# Patient Record
Sex: FEMALE | Race: BLACK OR AFRICAN AMERICAN | NOT HISPANIC OR LATINO | Employment: STUDENT | URBAN - METROPOLITAN AREA
[De-identification: names, ages, dates, MRNs, and addresses within clinical notes are randomized per-mention and may not be internally consistent; named-entity substitution may affect disease eponyms.]

---
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
## 2024-01-24 ENCOUNTER — HOSPITAL ENCOUNTER (EMERGENCY)
Facility: CLINIC | Age: 19
Discharge: HOME | End: 2024-01-24
Attending: FAMILY MEDICINE | Admitting: PHYSICIAN ASSISTANT
Payer: COMMERCIAL

## 2024-01-24 VITALS
WEIGHT: 91.2 LBS | OXYGEN SATURATION: 100 % | RESPIRATION RATE: 16 BRPM | SYSTOLIC BLOOD PRESSURE: 110 MMHG | TEMPERATURE: 99.7 F | DIASTOLIC BLOOD PRESSURE: 78 MMHG | HEART RATE: 70 BPM

## 2024-01-24 DIAGNOSIS — Z11.3 ROUTINE SCREENING FOR STI (SEXUALLY TRANSMITTED INFECTION): ICD-10-CM

## 2024-01-24 DIAGNOSIS — K62.5 BRIGHT RED BLOOD PER RECTUM: ICD-10-CM

## 2024-01-24 DIAGNOSIS — Z32.01 PREGNANCY TEST POSITIVE: ICD-10-CM

## 2024-01-24 DIAGNOSIS — K64.4 EXTERNAL HEMORRHOID: ICD-10-CM

## 2024-01-24 LAB
ALBUMIN SERPL BCG-MCNC: 4.4 G/DL (ref 3.5–5.2)
ALP SERPL-CCNC: 56 U/L (ref 40–150)
ALT SERPL W P-5'-P-CCNC: 12 U/L (ref 0–50)
ANION GAP SERPL CALCULATED.3IONS-SCNC: 10 MMOL/L (ref 7–15)
AST SERPL W P-5'-P-CCNC: 27 U/L (ref 0–35)
BASOPHILS # BLD AUTO: 0 10E3/UL (ref 0–0.2)
BASOPHILS NFR BLD AUTO: 1 %
BILIRUB SERPL-MCNC: 0.7 MG/DL
BUN SERPL-MCNC: 8.8 MG/DL (ref 6–20)
CALCIUM SERPL-MCNC: 9.6 MG/DL (ref 8.6–10)
CHLORIDE SERPL-SCNC: 102 MMOL/L (ref 98–107)
CREAT SERPL-MCNC: 0.69 MG/DL (ref 0.51–0.95)
DEPRECATED HCO3 PLAS-SCNC: 23 MMOL/L (ref 22–29)
EGFRCR SERPLBLD CKD-EPI 2021: >90 ML/MIN/1.73M2
EOSINOPHIL # BLD AUTO: 0.1 10E3/UL (ref 0–0.7)
EOSINOPHIL NFR BLD AUTO: 1 %
ERYTHROCYTE [DISTWIDTH] IN BLOOD BY AUTOMATED COUNT: 12.1 % (ref 10–15)
GLUCOSE SERPL-MCNC: 111 MG/DL (ref 70–99)
HCG INTACT+B SERPL-ACNC: 44 MIU/ML
HCG UR QL: NEGATIVE
HCG UR QL: POSITIVE
HCT VFR BLD AUTO: 42.3 % (ref 35–47)
HGB BLD-MCNC: 14.3 G/DL (ref 11.7–15.7)
IMM GRANULOCYTES # BLD: 0 10E3/UL
IMM GRANULOCYTES NFR BLD: 0 %
INTERNAL QC OK POCT: NORMAL
LYMPHOCYTES # BLD AUTO: 3.7 10E3/UL (ref 0.8–5.3)
LYMPHOCYTES NFR BLD AUTO: 44 %
MCH RBC QN AUTO: 30.3 PG (ref 26.5–33)
MCHC RBC AUTO-ENTMCNC: 33.8 G/DL (ref 31.5–36.5)
MCV RBC AUTO: 90 FL (ref 78–100)
MONOCYTES # BLD AUTO: 0.3 10E3/UL (ref 0–1.3)
MONOCYTES NFR BLD AUTO: 4 %
NEUTROPHILS # BLD AUTO: 4.2 10E3/UL (ref 1.6–8.3)
NEUTROPHILS NFR BLD AUTO: 50 %
NRBC # BLD AUTO: 0 10E3/UL
NRBC BLD AUTO-RTO: 0 /100
PLATELET # BLD AUTO: 173 10E3/UL (ref 150–450)
POCT KIT EXPIRATION DATE: NORMAL
POCT KIT LOT NUMBER: NORMAL
POTASSIUM SERPL-SCNC: 3.5 MMOL/L (ref 3.4–5.3)
PROT SERPL-MCNC: 6.9 G/DL (ref 6.4–8.3)
RBC # BLD AUTO: 4.72 10E6/UL (ref 3.8–5.2)
SODIUM SERPL-SCNC: 135 MMOL/L (ref 135–145)
WBC # BLD AUTO: 8.3 10E3/UL (ref 4–11)

## 2024-01-24 PROCEDURE — 85025 COMPLETE CBC W/AUTO DIFF WBC: CPT | Performed by: PHYSICIAN ASSISTANT

## 2024-01-24 PROCEDURE — 99283 EMERGENCY DEPT VISIT LOW MDM: CPT | Performed by: PHYSICIAN ASSISTANT

## 2024-01-24 PROCEDURE — 36415 COLL VENOUS BLD VENIPUNCTURE: CPT | Performed by: PHYSICIAN ASSISTANT

## 2024-01-24 PROCEDURE — 81025 URINE PREGNANCY TEST: CPT | Performed by: PHYSICIAN ASSISTANT

## 2024-01-24 PROCEDURE — 80053 COMPREHEN METABOLIC PANEL: CPT | Performed by: PHYSICIAN ASSISTANT

## 2024-01-24 PROCEDURE — 84702 CHORIONIC GONADOTROPIN TEST: CPT | Performed by: PHYSICIAN ASSISTANT

## 2024-01-24 PROCEDURE — 87491 CHLMYD TRACH DNA AMP PROBE: CPT | Performed by: PHYSICIAN ASSISTANT

## 2024-01-24 ASSESSMENT — ACTIVITIES OF DAILY LIVING (ADL)
ADLS_ACUITY_SCORE: 35
ADLS_ACUITY_SCORE: 35

## 2024-01-25 ENCOUNTER — TELEPHONE (OUTPATIENT)
Dept: BEHAVIORAL HEALTH | Facility: CLINIC | Age: 19
End: 2024-01-25
Payer: COMMERCIAL

## 2024-01-25 ENCOUNTER — TELEPHONE (OUTPATIENT)
Dept: FAMILY MEDICINE | Facility: CLINIC | Age: 19
End: 2024-01-25
Payer: COMMERCIAL

## 2024-01-25 LAB
C TRACH DNA SPEC QL PROBE+SIG AMP: NEGATIVE
N GONORRHOEA DNA SPEC QL NAA+PROBE: NEGATIVE

## 2024-01-25 NOTE — TELEPHONE ENCOUNTER
Reached patient. Scheduled 1/26/2024.     Sent [employer] activation link to set up [employer]. Encouraged to compete screeners prior to appointment. Said will complete through [employer] once set up.    Ameena Joseph  Transition Clinic Coordinator  01/25/24 10:54 AM

## 2024-01-25 NOTE — TELEPHONE ENCOUNTER
Does not appear patient has been seen at [facility] previously  Was patient requesting to establish care at [place] or [facility]?  Appears PCP is currently outside of the system  Could possibly forward to ED provider for review of results?  Thanks,  Emma REID RN

## 2024-01-25 NOTE — ED TRIAGE NOTES
Pt states that earlier today she noticed some blood in her stool.Pt also requeting STD and pregnancy test.

## 2024-01-25 NOTE — TELEPHONE ENCOUNTER
Pt called looking for results.   Please call her back when they have been looked at   Thanks,   Moo Nam RN  [facility]

## 2024-01-25 NOTE — ED NOTES
Pt asking for mental health referral, due to recent poor mental health. GISELLA Soto notified of pt request and speaking to pt.

## 2024-01-25 NOTE — ED NOTES
Pt emotional and upset after finding out she is pregnant. Writer spent time with pt, emotional support, and comfort.

## 2024-01-25 NOTE — DISCHARGE INSTRUCTIONS
Here in the emergency room you have reassuring vital signs.  Do not have any current bleeding here.  On your exam it does look like you have an external hemorrhoid although I do not see any findings for bleeding.  We discussed high-fiber foods, pushing fluids, continue to monitor.  I will place referral to follow-up with primary care.  They should be calling to schedule a follow-up visit.  He should return he develop any new or worsening symptoms including any significant bleeding, abdominal pain fevers any other new or worsening symptoms.    In addition I placed a psychiatry referral and they should be calling to schedule follow-up.  We discussed if you ever feel unsafe at home have thoughts of hurting yourself or others, I recommend returning here to the emergency department.    You also had a positive pregnancy test today.  We discussed the importance of following with OB/GYN.  I placed a referral.  If you develop any abdominal pain vaginal bleeding you need to return back to the emergency room.  We discussed starting prenatal vitamin, avoiding alcohol, smoking and other medications that are not safe in pregnancy.    If you develop any new or worsening symptoms, is important to return right away to the emergency department for further evaluation and management.

## 2024-01-25 NOTE — ED PROVIDER NOTES
ED Provider Note  [facility]      History     Chief Complaint   Patient presents with    Melena     Pt states that earlier today she noticed some blood in her stool.    Exposure to STD     Pt requesting std and pregnancy check.     HPI    Lynsey Sharpe is a 18 year old female who presents emergency department tonight with concerns for blurred blood per rectum.  Patient states that starting today she noticed several drops of bright red blood on the toilet paper after having a bowel movement, as well as some bright red blood on the stool in the toilet.  She has had approximately 3 bowel movements today.  She states she is not having any perirectal pain.  She denies any rectal trauma or injury.  She is not anticoagulated.  She denies any associate abdominal pain vomiting, or coffee-ground emesis.  No known history of hemorrhoids.    In addition patient states she does want STI testing and pregnancy testing today.  She is monogamous with a male partner.  She denies any associated discharge or vaginal itching.  No pelvic pain.  Her last period ended earlier this month about 3 weeks ago.  She has been sexually active without protection.  She reports a history of recent BV which was treated with Flagyl and resolved.    Past Medical History  No past medical history on file.  No past surgical history on file.  No current outpatient medications on file.    No Known Allergies  Family History  No family history on file.  Social History          A medically appropriate review of systems was performed with pertinent positives and negatives noted in the HPI, and all other systems negative.    Physical Exam   BP: 117/76  Pulse: 90  Temp: 99.7  F (37.6  C)  Resp: 16  Weight: 41.4 kg (91 lb 3.2 oz)  SpO2: 100 %  Physical Exam    GENERAL APPEARANCE: The patient is well developed, well appearing, and in no acute distress.  HEAD:  Normocephalic and atraumatic.   EENT: Voice normal.  NECK: Trachea is  midline.  ABDOMEN: Soft, flat, and benign. No mass, tenderness, guarding, or rebound.Bowel sounds are present.  Female chaperone present for perirectal exam.  Appreciated small external hemorrhoid at the 6 clock position without visible bleeding.  No visible fissures noted.  ELGIN deferred.  EXTREMITIES: No cyanosis, clubbing, or edema.  NEUROLOGIC: No focal sensory or motor deficits are noted.  PSYCHIATRIC: The patient is awake, alert.  Appropriate mood and affect.  SKIN: Warm, dry, and well perfused. Good turgor.    ED Course, Procedures, & Data         Results for orders placed or performed during the hospital encounter of 01/24/24   HCG qualitative urine     Status: Abnormal   Result Value Ref Range    hCG Urine Qualitative Positive (A) Negative   HCG quantitative pregnancy (blood)     Status: Abnormal   Result Value Ref Range    hCG Quantitative 44 (H) <5 mIU/mL   Comprehensive metabolic panel     Status: Abnormal   Result Value Ref Range    Sodium 135 135 - 145 mmol/L    Potassium 3.5 3.4 - 5.3 mmol/L    Carbon Dioxide (CO2) 23 22 - 29 mmol/L    Anion Gap 10 7 - 15 mmol/L    Urea Nitrogen 8.8 6.0 - 20.0 mg/dL    Creatinine 0.69 0.51 - 0.95 mg/dL    GFR Estimate >90 >60 mL/min/1.73m2    Calcium 9.6 8.6 - 10.0 mg/dL    Chloride 102 98 - 107 mmol/L    Glucose 111 (H) 70 - 99 mg/dL    Alkaline Phosphatase 56 40 - 150 U/L    AST 27 0 - 35 U/L    ALT 12 0 - 50 U/L    Protein Total 6.9 6.4 - 8.3 g/dL    Albumin 4.4 3.5 - 5.2 g/dL    Bilirubin Total 0.7 <=1.2 mg/dL   CBC with platelets and differential     Status: None   Result Value Ref Range    WBC Count 8.3 4.0 - 11.0 10e3/uL    RBC Count 4.72 3.80 - 5.20 10e6/uL    Hemoglobin 14.3 11.7 - 15.7 g/dL    Hematocrit 42.3 35.0 - 47.0 %    MCV 90 78 - 100 fL    MCH 30.3 26.5 - 33.0 pg    MCHC 33.8 31.5 - 36.5 g/dL    RDW 12.1 10.0 - 15.0 %    Platelet Count 173 150 - 450 10e3/uL    % Neutrophils 50 %    % Lymphocytes 44 %    % Monocytes 4 %    % Eosinophils 1 %    %  Basophils 1 %    % Immature Granulocytes 0 %    NRBCs per 100 WBC 0 <1 /100    Absolute Neutrophils 4.2 1.6 - 8.3 10e3/uL    Absolute Lymphocytes 3.7 0.8 - 5.3 10e3/uL    Absolute Monocytes 0.3 0.0 - 1.3 10e3/uL    Absolute Eosinophils 0.1 0.0 - 0.7 10e3/uL    Absolute Basophils 0.0 0.0 - 0.2 10e3/uL    Absolute Immature Granulocytes 0.0 <=0.4 10e3/uL    Absolute NRBCs 0.0 10e3/uL   hCG qual urine POCT     Status: Normal   Result Value Ref Range    HCG Qual Urine Negative Negative    Internal QC Check POCT Valid Valid    POCT Kit Lot Number [id number]     POCT Kit Expiration Date 07-    CBC with platelets differential     Status: None    Narrative    The following orders were created for panel order CBC with platelets differential.  Procedure                               Abnormality         Status                     ---------                               -----------         ------                     CBC with platelets and d...[[id number]]                      Final result                 Please view results for these tests on the individual orders.   ABO/Rh type and screen *Canceled*     Status: None ()    Narrative    The following orders were created for panel order ABO/Rh type and screen.  Procedure                               Abnormality         Status                     ---------                               -----------         ------                     Adult Type and Screen[[id number]]                                                         Please view results for these tests on the individual orders.   ABO/Rh type and screen     Status: None ()    Narrative    The following orders were created for panel order ABO/Rh type and screen.  Procedure                               Abnormality         Status                     ---------                               -----------         ------                     Adult Type and Screen[[id number]]                                                         Please  view results for these tests on the individual orders.     Medications - No data to display  Labs Ordered and Resulted from Time of ED Arrival to Time of ED Departure   HCG QUALITATIVE URINE - Abnormal       Result Value    hCG Urine Qualitative Positive (*)    HCG QUANTITATIVE PREGNANCY - Abnormal    hCG Quantitative 44 (*)    COMPREHENSIVE METABOLIC PANEL - Abnormal    Sodium 135      Potassium 3.5      Carbon Dioxide (CO2) 23      Anion Gap 10      Urea Nitrogen 8.8      Creatinine 0.69      GFR Estimate >90      Calcium 9.6      Chloride 102      Glucose 111 (*)     Alkaline Phosphatase 56      AST 27      ALT 12      Protein Total 6.9      Albumin 4.4      Bilirubin Total 0.7     HCG QUALITATIVE URINE POCT - Normal    HCG Qual Urine Negative      Internal QC Check POCT Valid      POCT Kit Lot Number [id number]      POCT Kit Expiration Date 07-     CBC WITH PLATELETS AND DIFFERENTIAL    WBC Count 8.3      RBC Count 4.72      Hemoglobin 14.3      Hematocrit 42.3      MCV 90      MCH 30.3      MCHC 33.8      RDW 12.1      Platelet Count 173      % Neutrophils 50      % Lymphocytes 44      % Monocytes 4      % Eosinophils 1      % Basophils 1      % Immature Granulocytes 0      NRBCs per 100 WBC 0      Absolute Neutrophils 4.2      Absolute Lymphocytes 3.7      Absolute Monocytes 0.3      Absolute Eosinophils 0.1      Absolute Basophils 0.0      Absolute Immature Granulocytes 0.0      Absolute NRBCs 0.0     TYPE AND SCREEN, ADULT   CHLAMYDIA TRACHOMATIS/NEISSERIA GONORRHOEAE BY PCR   ABO/RH TYPE AND SCREEN     No orders to display          Critical care was not performed.     Medical Decision Making  The patient's presentation was of low complexity (an acute and uncomplicated illness or injury).    The patient's evaluation involved:  ordering and/or review of 3+ test(s) in this encounter (see separate area of note for details)    The patient's management necessitated only low risk treatment.    Assessment & Plan     This is a 18-year-old female presenting with 3 episodes of bright red blood per rectum on wiping without rectal pain abdominal pain vomiting use of anticoagulation or history of hemorrhoids.  On presentation here vital signs within normal limits.  External exam shows external hemorrhoid at the 6 o'clock position without findings to suggest current bleeding, thrombus, or fissure.  ELGIN deferred as patient is not having any reported constipation or rectal pain.  She is not losing large volume blood at this time.  Suspect the external hemorrhoid was bleeding versus bleeding internal hemorrhoid.  We discussed recommendations of following up with primary care.  We discussed importance of high-fiber diet and pushing fluids.  Patient did request chlamydia and gonorrhea testing along with her pregnancy test.  She not exhibiting any symptoms of STIs.  Chlamydia gonorrhea obtained, pregnancy obtained here, initially per nursing staff negative, notified by lab that the results had returned positive.  On reevaluation of the point-of-care pregnancy this also is positive.  Discussed these results with the patient.  We obtained additional blood work including CBC chemistry and a beta quant, CBC without leukocytosis or anemia chemistry normal beta-hCG 44.  Patient reports in prior pregnancies she has been told to electively abort due to her history of anorexia and nutrition status.  We discussed starting prenatal vitamin recommendations of following up closely with OB/GYN.  Referral placed.  We discussed return precautions.    Patient seen and discussed with attending physician , who agrees with my plan of care.    Patient has no other questions or concerns at this time.  Red flag signs were addressed, and they were in agreement with the patient care plan provided.    I have reviewed the nursing notes. I have reviewed the findings, diagnosis, plan and need for follow up with the patient.    New Prescriptions    No  medications on file       Final diagnoses:   Bright red blood per rectum   Routine screening for STI (sexually transmitted infection)   External hemorrhoid   Pregnancy test positive       ISABEL Ho  [facility] EMERGENCY DEPARTMENT  1/24/2024

## 2024-01-26 ENCOUNTER — TELEPHONE (OUTPATIENT)
Dept: BEHAVIORAL HEALTH | Facility: CLINIC | Age: 19
End: 2024-01-26
Payer: COMMERCIAL

## 2024-01-26 NOTE — TELEPHONE ENCOUNTER
Link to virtual visit sent to [phone] at 1402 and 1409.  Pt did not join virtual visit.   Call placed at 1416 - left message encouraging pt to call back if she would like to reschedule appt

## 2024-02-01 ENCOUNTER — APPOINTMENT (OUTPATIENT)
Dept: GENERAL RADIOLOGY | Facility: CLINIC | Age: 19
End: 2024-02-01
Payer: COMMERCIAL

## 2024-02-01 ENCOUNTER — HOSPITAL ENCOUNTER (EMERGENCY)
Facility: CLINIC | Age: 19
Discharge: HOME | End: 2024-02-01
Attending: EMERGENCY MEDICINE | Admitting: EMERGENCY MEDICINE
Payer: COMMERCIAL

## 2024-02-01 VITALS
SYSTOLIC BLOOD PRESSURE: 104 MMHG | HEIGHT: 62 IN | TEMPERATURE: 98.6 F | HEART RATE: 81 BPM | BODY MASS INDEX: 16.8 KG/M2 | DIASTOLIC BLOOD PRESSURE: 83 MMHG | WEIGHT: 91.3 LBS | RESPIRATION RATE: 19 BRPM | OXYGEN SATURATION: 97 %

## 2024-02-01 DIAGNOSIS — S61.012A THUMB LACERATION, LEFT, INITIAL ENCOUNTER: ICD-10-CM

## 2024-02-01 PROCEDURE — 12001 RPR S/N/AX/GEN/TRNK 2.5CM/<: CPT | Mod: FA | Performed by: EMERGENCY MEDICINE

## 2024-02-01 PROCEDURE — 250N000009 HC RX 250

## 2024-02-01 PROCEDURE — 99283 EMERGENCY DEPT VISIT LOW MDM: CPT | Performed by: EMERGENCY MEDICINE

## 2024-02-01 PROCEDURE — 73140 X-RAY EXAM OF FINGER(S): CPT | Mod: LT

## 2024-02-01 PROCEDURE — 99283 EMERGENCY DEPT VISIT LOW MDM: CPT | Mod: 25 | Performed by: EMERGENCY MEDICINE

## 2024-02-01 RX ORDER — LIDOCAINE HYDROCHLORIDE 10 MG/ML
10 INJECTION, SOLUTION INFILTRATION; PERINEURAL ONCE
Status: COMPLETED | OUTPATIENT
Start: 2024-02-01 | End: 2024-02-01

## 2024-02-01 RX ADMIN — LIDOCAINE HYDROCHLORIDE 10 ML: 10 INJECTION, SOLUTION INFILTRATION; PERINEURAL at 18:42

## 2024-02-01 ASSESSMENT — ACTIVITIES OF DAILY LIVING (ADL): ADLS_ACUITY_SCORE: 35

## 2024-02-01 NOTE — ED PROVIDER NOTES
[facility] EMERGENCY DEPARTMENT ([facility])    2/01/24      ED PROVIDER NOTE        History     Chief Complaint   Patient presents with    Laceration     Left thumb and 4th finger cut on knife     The history is provided by the patient. No  was used.     Lynsey Sharpe is a 18 year old female presents to the ED for evaluation of accidental laceration to left thumb and ring finger with a knife. Her past medical history includes abnormal uterine bleeding, epigastric pain, lung nodule, and lower GI bleed.  She presents for evaluation of a left thumb laceration that occurred just prior to arrival.  She states that she was trying to cut a zip tie off of a child's toy with a knife when the knife slipped causing a laceration to the lateral aspect of her thumb and a superficial laceration to the dorsal aspect of her fourth left finger.  She reports some numbness just proximal to the laceration but denies any distal numbness or tingling.  She did wash the wound out with water prior to arrival.  On thumb opposition strength, she does have some noted decreased strength and states that it is due to inability versus pain.  She states that her last tetanus vaccination was within the last 5 years.  She otherwise denies any other injuries.  She states that this was purely an accident and not in any way intentional.  She states the knife was clean.    Per [facility] last Tdap 6/21/2020.  She is right-hand dominant.      Past Medical History  Past Medical History:   Diagnosis Date    Sjogren's syndrome (H24)      History reviewed. No pertinent surgical history.  No current outpatient medications on file.    No Known Allergies  Family History  History reviewed. No pertinent family history.  Social History   Social History     Tobacco Use    Smoking status: Never    Smokeless tobacco: Never   Substance Use Topics    Alcohol use: Not Currently    Drug use: Yes     Types: Marijuana         A medically appropriate  "review of systems was performed with pertinent positives and negatives noted in the HPI, and all other systems negative.    Physical Exam   BP: 107/73  Pulse: 79  Temp: 98.2  F (36.8  C)  Resp: 16  Height: 157.5 cm (5' 2\")  Weight: 41.4 kg (91 lb 4.8 oz)  SpO2: 96 %  Physical Exam  Constitutional:       General: She is not in acute distress.     Appearance: Normal appearance. She is normal weight. She is not ill-appearing, toxic-appearing or diaphoretic.   Cardiovascular:      Rate and Rhythm: Normal rate and regular rhythm.      Pulses: Normal pulses.      Heart sounds: Normal heart sounds.   Pulmonary:      Effort: Pulmonary effort is normal.      Breath sounds: Normal breath sounds.   Musculoskeletal:      Left hand: Laceration and tenderness (at laceration site) present. No swelling or deformity. Normal range of motion. Decreased strength of thumb/finger opposition (minimal). Normal strength of finger abduction and wrist extension. Decreased sensation (small area of skin just proximal to the laceration; does not extend  proximal of the thumb MCP joint). Normal capillary refill. Normal pulse.        Hands:       Comments: No tendon injury or involvement on evaluation and manipulation of the wound area   Neurological:      General: No focal deficit present.      Mental Status: She is alert and oriented to person, place, and time.   Psychiatric:         Mood and Affect: Mood normal.         Behavior: Behavior normal.           ED Course, Procedures, & Data      [facility]    -Laceration Repair    Date/Time: 2/1/2024 9:50 PM    Performed by: Sudha Parker PA-C  Authorized by: Sudha Parker PA-C    Risks, benefits and alternatives discussed.      ANESTHESIA (see MAR for exact dosages):     Anesthesia method:  Nerve block    Block location:  Left thumb    Block needle gauge:  27 G    Block anesthetic:  Lidocaine 1% w/o epi    Block technique:  Digital block    " Block injection procedure:  Anatomic landmarks identified, anatomic landmarks palpated, negative aspiration for blood, introduced needle and incremental injection    Block outcome:  Anesthesia achieved    LACERATION DETAILS     Location:  Finger    Finger location:  L thumb    Length (cm):  2    REPAIR TYPE:     Repair type:  Simple    EXPLORATION:     Hemostasis achieved with:  Direct pressure    Wound exploration: wound explored through full range of motion and entire depth of wound probed and visualized      Wound extent: nerve damage      Wound extent: no foreign body, no tendon damage and no underlying fracture      Contaminated: no      TREATMENT:     Area cleansed with:  Saline    Amount of cleaning:  Standard    Irrigation solution:  Sterile saline    Irrigation method:  Pressure wash    Visualized foreign bodies/material removed: no      SKIN REPAIR     Repair method:  Sutures    Suture size:  6-0    Suture technique:  Simple interrupted    Number of sutures:  6    APPROXIMATION     Approximation:  Close    POST-PROCEDURE DETAILS     Dressing:  Antibiotic ointment, adhesive bandage and splint for protection      PROCEDURE    Patient Tolerance:  Patient tolerated the procedure well with no immediate complications  [facility]    -Laceration Repair    Date/Time: 2/1/2024 9:56 PM    Performed by: Sudha Parker PA-C  Authorized by: Sudha Parker PA-C    Risks, benefits and alternatives discussed.      ANESTHESIA (see MAR for exact dosages):     Anesthesia method:  None  LACERATION DETAILS     Location:  Finger    Finger location:  L ring finger    Length (cm):  0.5    REPAIR TYPE:     Repair type:  Simple    EXPLORATION:     Hemostasis achieved with:  Direct pressure    Contaminated: no      TREATMENT:     Area cleansed with:  Saline    Amount of cleaning:  Standard    Irrigation solution:  Sterile saline    Irrigation method:  Pressure wash    Visualized  foreign bodies/material removed: no      SKIN REPAIR     Repair method:  Tissue adhesive    APPROXIMATION     Approximation:  Close    POST-PROCEDURE DETAILS     Dressing:  Open (no dressing)      PROCEDURE    Patient Tolerance:  Patient tolerated the procedure well with no immediate complications               Tetanus vaccination status reviewed: last tetanus booster 4 years ago (06/21/20), tetanus re-vaccination not indicated.       Results for orders placed or performed during the hospital encounter of 02/01/24   -Laceration Repair     Status: None (Preliminary result)    Narrative    Sudha Parker PA-C     2/1/2024  9:59 PM  [facility]    -Laceration Repair    Date/Time: 2/1/2024 9:50 PM    Performed by: Sudha Parker PA-C  Authorized by: Sudha Parker PA-C    Risks, benefits and alternatives discussed.      ANESTHESIA (see MAR for exact dosages):     Anesthesia method:  Nerve block    Block location:  Left thumb    Block needle gauge:  27 G    Block anesthetic:  Lidocaine 1% w/o epi    Block technique:  Digital block    Block injection procedure:  Anatomic landmarks identified, anatomic   landmarks palpated, negative aspiration for blood, introduced needle and   incremental injection    Block outcome:  Anesthesia achieved    LACERATION DETAILS     Location:  Finger    Finger location:  L thumb    Length (cm):  2    REPAIR TYPE:     Repair type:  Simple    EXPLORATION:     Hemostasis achieved with:  Direct pressure    Wound exploration: wound explored through full range of motion and   entire depth of wound probed and visualized      Wound extent: nerve damage      Wound extent: no foreign body, no tendon damage and no underlying   fracture      Contaminated: no      TREATMENT:     Area cleansed with:  Saline    Amount of cleaning:  Standard    Irrigation solution:  Sterile saline    Irrigation method:  Pressure wash    Visualized foreign  bodies/material removed: no      SKIN REPAIR     Repair method:  Sutures    Suture size:  6-0    Suture technique:  Simple interrupted    Number of sutures:  6    APPROXIMATION     Approximation:  Close    POST-PROCEDURE DETAILS     Dressing:  Antibiotic ointment, adhesive bandage and splint for   protection      PROCEDURE    Patient Tolerance:  Patient tolerated the procedure well with no immediate   complications   -Laceration Repair     Status: None (Preliminary result)    Narrative    Sudha Parker PA-C     2/1/2024  9:59 PM  [facility]    -Laceration Repair    Date/Time: 2/1/2024 9:56 PM    Performed by: Sudha Parker PA-C  Authorized by: Sudha Parker PA-C    Risks, benefits and alternatives discussed.      ANESTHESIA (see MAR for exact dosages):     Anesthesia method:  None  LACERATION DETAILS     Location:  Finger    Finger location:  L ring finger    Length (cm):  0.5    REPAIR TYPE:     Repair type:  Simple    EXPLORATION:     Hemostasis achieved with:  Direct pressure    Contaminated: no      TREATMENT:     Area cleansed with:  Saline    Amount of cleaning:  Standard    Irrigation solution:  Sterile saline    Irrigation method:  Pressure wash    Visualized foreign bodies/material removed: no      SKIN REPAIR     Repair method:  Tissue adhesive    APPROXIMATION     Approximation:  Close    POST-PROCEDURE DETAILS     Dressing:  Open (no dressing)      PROCEDURE    Patient Tolerance:  Patient tolerated the procedure well with no immediate   complications   Fingers XR, 2-3 views, left     Status: None    Narrative    EXAM: XR FINGER LEFT G/E 2 VIEWS  LOCATION: [facility]  DATE: 2/1/2024    INDICATION: Left thumb pain.  COMPARISON: None.      Impression    IMPRESSION: Normal joint spaces and alignment. No fracture.       Medications   lidocaine 1 % injection 10 mL (10 mLs Intradermal $Given 2/1/24 1199)      Labs Ordered and Resulted from Time of ED Arrival to Time of ED Departure - No data to display  Fingers XR, 2-3 views, left   Final Result   IMPRESSION: Normal joint spaces and alignment. No fracture.                Critical care was not performed.     Medical Decision Making  The patient's presentation was of low complexity (an acute and uncomplicated illness or injury).    The patient's evaluation involved:  ordering and/or review of 1 test(s) in this encounter (see separate area of note for details)    The patient's management necessitated moderate risk (a decision regarding minor procedure (laceration repair) with risk factors of none).    Assessment & Plan    Lynsey is a 18-year-old female that presented with complaints of a left thumb laceration after accidentally cutting herself with a knife.  Acceptable vital signs.  Patient in no acute distress and nontoxic-appearing.  Skin numbness proximal to the laceration but no extension into the hand, wrist, or forearm.  Mild amount of weakness with thumb opposition noted compared to right hand but upon wound evaluation, no noted tendon involvement or injury.  Patient otherwise has full active range of motion of the affected left thumb.  X-ray negative for any fracture, dislocation, or radiopaque foreign body.  Digital block to the thumb performed with full anesthesia.  Laceration was repaired with sutures.  Subsequent superficial laceration to the dorsal aspect of the left ring finger repaired with skin adhesive.  Tetanus vaccination is currently up-to-date and a booster was not administered today.  Patient otherwise stable for discharge home.  Strict return precautions given.  Due to patient's mild weakness elicited on some and finger opposition, a referral to orthopedics hand surgery was sent for reevaluation in the next 1 to 2 weeks.  Advise suture removal in the next 7 to 10 days by PCP office.  Wound care instructions discussed at length and given in the  discharge paperwork.  Advised Tylenol ibuprofen as needed for discomfort.  Patient was agreeable to the discharge treatment plan, voiced understanding, and all questions answered prior to discharge.    I have reviewed the nursing notes. I have reviewed the findings, diagnosis, plan and need for follow up with the patient.    There are no discharge medications for this patient.      Final diagnoses:   Thumb laceration, left, initial encounter       Sudha Parker PA-C    [facility] EMERGENCY DEPARTMENT  2/1/2024        Sudha Parker PA-C  02/01/24 2159  ---------------------------------------------------------------------------------------------------------  --    ED Attending Physician Attestation    I Lacey Ricks MD, cared for this patient with the Advanced Practice Provider (ANDERS). I have performed a history and physical examination of the patient independent of the ANDERS. I reviewed the ANDERS's documentation above and agree with the documented findings and plan of care. I personally provided a substantive portion of the care for this patient, including the complete Medical Decision Making. Please see the ANDERS's documentation for full details.    Summary of HPI, PE, ED Course   Patient is a 18 year old female evaluated in the emergency department for L thumb laceration.  Laceration is located along the medial aspect of the left thumb at the IP joint.  No visible tendon on exam, though patient is mildly weak with thumb opposition.  Still suspect it is unlikely that there is a tendon injury/laceration, however we will advise that she follow-up with orthopedics clinic if she is continuing to notice any weakness.  Wound care instructions given.  Patient verbalizes understanding.  After the completion of care in the emergency department, the patient was discharged.        MDM:  I agree with the MDM as above documented by the ANDERS    Lacey Ricks MD  Emergency Medicine        [place],  Lacey Arias MD  02/01/24 9105

## 2024-02-01 NOTE — ED TRIAGE NOTES
Triage Assessment (Adult)       Row Name 02/01/24 1715          Triage Assessment    Airway WDL WDL        Respiratory WDL    Respiratory WDL WDL        Skin Circulation/Temperature WDL    Skin Circulation/Temperature WDL WDL        Cardiac WDL    Cardiac WDL WDL        Peripheral/Neurovascular WDL    Peripheral Neurovascular WDL WDL        Cognitive/Neuro/Behavioral WDL    Cognitive/Neuro/Behavioral WDL WDL

## 2024-02-02 NOTE — DISCHARGE INSTRUCTIONS
Have sutures removed in 7 to 10 days by your PCP office  Keep the wound area clean and covered as to not introduce infection to the area  You may utilize Vaseline or Aquaphor to the suture area to keep the wound area soft and provide a protective barrier prior to bandages  Do not submerge the wound area in water until sutures are removed and wound has healed  A referral to orthopedics/hand surgery was sent from the ED today due to mild decrease in strength of your thumb.  Suspect decrease in strength is due to pain of the laceration site but if you feel your symptoms have not improved after wound has become healing, do not hesitate to follow-up with their office for reevaluation  Otherwise continue to monitor the wound area for any possible development of infection including redness, swelling, drainage, red streaking from the wound site or systemic symptoms such as fever, chills, nausea, or vomiting and seek medical attention appropriately

## 2024-02-13 NOTE — TELEPHONE ENCOUNTER
DIAGNOSIS: Thumb laceration, left, initial encounter [S61.012A]   APPOINTMENT DATE: 02/14/2024   NOTES STATUS DETAILS   DISCHARGE REPORT from the ER Internal 02/01/2024 - [facility] ED   XRAYS (IMAGES & REPORTS) Internal 02/01/2024 - Left Finger

## 2024-02-14 ENCOUNTER — PRE VISIT (OUTPATIENT)
Dept: ORTHOPEDICS | Facility: CLINIC | Age: 19
End: 2024-02-14

## 2024-04-19 ENCOUNTER — PATIENT OUTREACH (OUTPATIENT)
Dept: ONCOLOGY | Facility: CLINIC | Age: 19
End: 2024-04-19

## 2024-04-19 ENCOUNTER — APPOINTMENT (OUTPATIENT)
Dept: GENERAL RADIOLOGY | Facility: CLINIC | Age: 19
End: 2024-04-19
Attending: FAMILY MEDICINE
Payer: COMMERCIAL

## 2024-04-19 ENCOUNTER — HOSPITAL ENCOUNTER (EMERGENCY)
Facility: CLINIC | Age: 19
Discharge: HOME | End: 2024-04-19
Attending: FAMILY MEDICINE | Admitting: FAMILY MEDICINE
Payer: COMMERCIAL

## 2024-04-19 ENCOUNTER — APPOINTMENT (OUTPATIENT)
Dept: CT IMAGING | Facility: CLINIC | Age: 19
End: 2024-04-19
Attending: FAMILY MEDICINE
Payer: COMMERCIAL

## 2024-04-19 ENCOUNTER — APPOINTMENT (OUTPATIENT)
Dept: ULTRASOUND IMAGING | Facility: CLINIC | Age: 19
End: 2024-04-19
Attending: FAMILY MEDICINE
Payer: COMMERCIAL

## 2024-04-19 VITALS
HEART RATE: 69 BPM | SYSTOLIC BLOOD PRESSURE: 105 MMHG | DIASTOLIC BLOOD PRESSURE: 72 MMHG | BODY MASS INDEX: 17.34 KG/M2 | WEIGHT: 94.2 LBS | OXYGEN SATURATION: 100 % | TEMPERATURE: 98 F | RESPIRATION RATE: 16 BRPM | HEIGHT: 62 IN

## 2024-04-19 DIAGNOSIS — R04.2 HEMOPTYSIS: ICD-10-CM

## 2024-04-19 DIAGNOSIS — R05.3 CHRONIC COUGH: ICD-10-CM

## 2024-04-19 DIAGNOSIS — J84.10 LUNG GRANULOMA (H): ICD-10-CM

## 2024-04-19 DIAGNOSIS — R91.8 PULMONARY NODULES: Primary | ICD-10-CM

## 2024-04-19 LAB
ALBUMIN SERPL BCG-MCNC: 4.1 G/DL (ref 3.5–5.2)
ALBUMIN UR-MCNC: NEGATIVE MG/DL
ALP SERPL-CCNC: 55 U/L (ref 40–150)
ALT SERPL W P-5'-P-CCNC: 11 U/L (ref 0–50)
ANION GAP SERPL CALCULATED.3IONS-SCNC: 11 MMOL/L (ref 7–15)
APPEARANCE UR: CLEAR
AST SERPL W P-5'-P-CCNC: 34 U/L (ref 0–45)
ATRIAL RATE - MUSE: 60 BPM
BACTERIA #/AREA URNS HPF: ABNORMAL /HPF
BASOPHILS # BLD AUTO: 0.1 10E3/UL (ref 0–0.2)
BASOPHILS NFR BLD AUTO: 1 %
BILIRUB SERPL-MCNC: 0.6 MG/DL
BILIRUB UR QL STRIP: NEGATIVE
BUN SERPL-MCNC: 6 MG/DL (ref 6–20)
CALCIUM SERPL-MCNC: 9.3 MG/DL (ref 8.6–10)
CHLORIDE SERPL-SCNC: 104 MMOL/L (ref 98–107)
COLOR UR AUTO: YELLOW
CREAT SERPL-MCNC: 0.77 MG/DL (ref 0.51–0.95)
D DIMER PPP FEU-MCNC: 0.62 UG/ML FEU (ref 0–0.5)
DEPRECATED HCO3 PLAS-SCNC: 22 MMOL/L (ref 22–29)
DIASTOLIC BLOOD PRESSURE - MUSE: NORMAL MMHG
EGFRCR SERPLBLD CKD-EPI 2021: >90 ML/MIN/1.73M2
EOSINOPHIL # BLD AUTO: 0.2 10E3/UL (ref 0–0.7)
EOSINOPHIL NFR BLD AUTO: 2 %
ERYTHROCYTE [DISTWIDTH] IN BLOOD BY AUTOMATED COUNT: 13.7 % (ref 10–15)
GLUCOSE SERPL-MCNC: 77 MG/DL (ref 70–99)
GLUCOSE UR STRIP-MCNC: NEGATIVE MG/DL
HCG UR QL: NEGATIVE
HCT VFR BLD AUTO: 43.9 % (ref 35–47)
HGB BLD-MCNC: 14.4 G/DL (ref 11.7–15.7)
HGB UR QL STRIP: NEGATIVE
HOLD SPECIMEN: NORMAL
IMM GRANULOCYTES # BLD: 0 10E3/UL
IMM GRANULOCYTES NFR BLD: 0 %
INTERNAL QC OK POCT: NORMAL
INTERPRETATION ECG - MUSE: NORMAL
KETONES UR STRIP-MCNC: NEGATIVE MG/DL
LEUKOCYTE ESTERASE UR QL STRIP: ABNORMAL
LIPASE SERPL-CCNC: 24 U/L (ref 13–60)
LYMPHOCYTES # BLD AUTO: 2.9 10E3/UL (ref 0.8–5.3)
LYMPHOCYTES NFR BLD AUTO: 42 %
MCH RBC QN AUTO: 28.8 PG (ref 26.5–33)
MCHC RBC AUTO-ENTMCNC: 32.8 G/DL (ref 31.5–36.5)
MCV RBC AUTO: 88 FL (ref 78–100)
MONOCYTES # BLD AUTO: 0.3 10E3/UL (ref 0–1.3)
MONOCYTES NFR BLD AUTO: 4 %
MUCOUS THREADS #/AREA URNS LPF: PRESENT /LPF
NEUTROPHILS # BLD AUTO: 3.5 10E3/UL (ref 1.6–8.3)
NEUTROPHILS NFR BLD AUTO: 51 %
NITRATE UR QL: NEGATIVE
NRBC # BLD AUTO: 0 10E3/UL
NRBC BLD AUTO-RTO: 0 /100
P AXIS - MUSE: 116 DEGREES
PH UR STRIP: 6.5 [PH] (ref 5–7)
PLATELET # BLD AUTO: 142 10E3/UL (ref 150–450)
POCT KIT EXPIRATION DATE: NORMAL
POCT KIT LOT NUMBER: NORMAL
POTASSIUM SERPL-SCNC: 4.1 MMOL/L (ref 3.4–5.3)
PR INTERVAL - MUSE: 142 MS
PROT SERPL-MCNC: 7 G/DL (ref 6.4–8.3)
QRS DURATION - MUSE: 74 MS
QT - MUSE: 416 MS
QTC - MUSE: 416 MS
R AXIS - MUSE: 131 DEGREES
RBC # BLD AUTO: 5 10E6/UL (ref 3.8–5.2)
RBC URINE: <1 /HPF
SODIUM SERPL-SCNC: 137 MMOL/L (ref 135–145)
SP GR UR STRIP: 1.02 (ref 1–1.03)
SQUAMOUS EPITHELIAL: 7 /HPF
SYSTOLIC BLOOD PRESSURE - MUSE: NORMAL MMHG
T AXIS - MUSE: 169 DEGREES
TRANSITIONAL EPI: <1 /HPF
TROPONIN T SERPL HS-MCNC: <6 NG/L
UROBILINOGEN UR STRIP-MCNC: NORMAL MG/DL
VENTRICULAR RATE- MUSE: 60 BPM
WBC # BLD AUTO: 6.9 10E3/UL (ref 4–11)
WBC URINE: 2 /HPF

## 2024-04-19 PROCEDURE — 84484 ASSAY OF TROPONIN QUANT: CPT | Performed by: FAMILY MEDICINE

## 2024-04-19 PROCEDURE — 85025 COMPLETE CBC W/AUTO DIFF WBC: CPT | Performed by: FAMILY MEDICINE

## 2024-04-19 PROCEDURE — 83690 ASSAY OF LIPASE: CPT | Performed by: FAMILY MEDICINE

## 2024-04-19 PROCEDURE — 250N000011 HC RX IP 250 OP 636: Performed by: FAMILY MEDICINE

## 2024-04-19 PROCEDURE — 93010 ELECTROCARDIOGRAM REPORT: CPT | Performed by: FAMILY MEDICINE

## 2024-04-19 PROCEDURE — 81025 URINE PREGNANCY TEST: CPT | Performed by: FAMILY MEDICINE

## 2024-04-19 PROCEDURE — 250N000009 HC RX 250: Performed by: FAMILY MEDICINE

## 2024-04-19 PROCEDURE — 93005 ELECTROCARDIOGRAM TRACING: CPT | Performed by: FAMILY MEDICINE

## 2024-04-19 PROCEDURE — 81001 URINALYSIS AUTO W/SCOPE: CPT | Performed by: FAMILY MEDICINE

## 2024-04-19 PROCEDURE — 84450 TRANSFERASE (AST) (SGOT): CPT | Performed by: FAMILY MEDICINE

## 2024-04-19 PROCEDURE — 85379 FIBRIN DEGRADATION QUANT: CPT | Performed by: FAMILY MEDICINE

## 2024-04-19 PROCEDURE — 71046 X-RAY EXAM CHEST 2 VIEWS: CPT

## 2024-04-19 PROCEDURE — 99285 EMERGENCY DEPT VISIT HI MDM: CPT | Mod: 25 | Performed by: FAMILY MEDICINE

## 2024-04-19 PROCEDURE — 76705 ECHO EXAM OF ABDOMEN: CPT

## 2024-04-19 PROCEDURE — 36415 COLL VENOUS BLD VENIPUNCTURE: CPT | Performed by: FAMILY MEDICINE

## 2024-04-19 PROCEDURE — 71275 CT ANGIOGRAPHY CHEST: CPT

## 2024-04-19 PROCEDURE — 99284 EMERGENCY DEPT VISIT MOD MDM: CPT | Mod: 25 | Performed by: FAMILY MEDICINE

## 2024-04-19 RX ORDER — IOPAMIDOL 755 MG/ML
100 INJECTION, SOLUTION INTRAVASCULAR ONCE
Status: COMPLETED | OUTPATIENT
Start: 2024-04-19 | End: 2024-04-19

## 2024-04-19 RX ADMIN — SODIUM CHLORIDE 72 ML: 9 INJECTION, SOLUTION INTRAVENOUS at 12:48

## 2024-04-19 RX ADMIN — IOPAMIDOL 50 ML: 755 INJECTION, SOLUTION INTRAVENOUS at 12:46

## 2024-04-19 ASSESSMENT — ACTIVITIES OF DAILY LIVING (ADL)
ADLS_ACUITY_SCORE: 35
ADLS_ACUITY_SCORE: 33
ADLS_ACUITY_SCORE: 35
ADLS_ACUITY_SCORE: 35

## 2024-04-19 ASSESSMENT — COLUMBIA-SUICIDE SEVERITY RATING SCALE - C-SSRS
1. IN THE PAST MONTH, HAVE YOU WISHED YOU WERE DEAD OR WISHED YOU COULD GO TO SLEEP AND NOT WAKE UP?: NO
6. HAVE YOU EVER DONE ANYTHING, STARTED TO DO ANYTHING, OR PREPARED TO DO ANYTHING TO END YOUR LIFE?: YES
2. HAVE YOU ACTUALLY HAD ANY THOUGHTS OF KILLING YOURSELF IN THE PAST MONTH?: NO

## 2024-04-19 NOTE — DISCHARGE INSTRUCTIONS
Discharge to home continue follow-up with primary MD as well as follow-up with pulmonology at the [facility] for possible bronchoscopy.

## 2024-04-19 NOTE — ED TRIAGE NOTES
Patient reports chest discomfort when coughing up. Patient also has some night and day sweats for 2-3 months now. Patient reports poor appetite and thinks she lost some weight in the last 2-3 months.      Triage Assessment (Adult)       Row Name 04/19/24 0851          Triage Assessment    Airway WDL WDL        Respiratory WDL    Respiratory WDL cough;X     Cough Frequency infrequent        Skin Circulation/Temperature WDL    Skin Circulation/Temperature WDL WDL        Cardiac WDL    Cardiac WDL WDL        Peripheral/Neurovascular WDL    Peripheral Neurovascular WDL WDL        Cognitive/Neuro/Behavioral WDL    Cognitive/Neuro/Behavioral WDL WDL

## 2024-04-19 NOTE — PROGRESS NOTES
New IP (Interventional Pulmonology) referral rec'd.  Chart reviewed.       New Patient: Interventional Pulmonary (Lung nodule) Nurse Navigator Note    Referring provider: Wayne Monahan MD [facility]    Referred to (specialty): Interventional Pulmonary (Lung nodule)    Requested provider (if applicable): n/a    Date Referral Received: 4/19/2024    Evaluation for :  Lung nodule    Clinical History (per Nurse review of records provided):    **BOOK MARKED**    [facility]  Encounter Providers Encounter Date   HÉCTOR Bella CNP (Attending)  NPI: [id number]  [phone] (Work)  [phone] (Fax)  [address] [place] [address]  Pulmonary Medicine    ASSESSMENT & PLAN:    1) Abnormal CT chest: RLL ill-defined nodular densities with multiple calcified lymph nodes and nodules. With positive histoplasma titer, suspect nodules are related to prior exposure to histoplasma. Cannot entirely rule out sarcoid without biopsy. Positive SS-A titer.   - Refer to ID by Rheumatology   - May need to proceed with EBUS.    2) Shortness of breath: with pulmonary infiltrates, anemia, proteinuria and constitutional symptoms. + SS-A titer. PFT without obstruction but evidence of air trapping, cannot entirely rule out asthma as contributing factor.   - Continue trial of Fluticasone 1 puff BID, re-evaluate at next visit    3) Cough: Etiology undefined. Cannot rule out presence of auto-immune disorder and/ or asthma as contributing factor.  - Continue Fluticasone 1 puff BID, re-evaluate at next visit  - F/u with Rheumatology as directed    4) Positive serologies: Positive SS-A titer with sun sensitivity and constitutional symptoms.  - Further w/u per Rheumatology      - F/u in 4 months, sooner if needed.    Christine Gonzalez CNP    Pulmonary Medicine  [facility]    Records Location: Epic & CE ([facility] & )    RECORDS  NEEDED:    Last FIVE years CHEST imaging pushed to PACS from [facility]  1/11/2023:  PFT's from [facility]  --thank you!!    Additional testing needed prior to consult: TBDPFT's

## 2024-04-19 NOTE — ED PROVIDER NOTES
[facility] ([facility])  4/19/24  ED 19      History     Chief Complaint   Patient presents with    Cough     Ongoing cough x 2-3 months, cough up some streaks of blood with sputum x 2-3 months.     Chest Pain     HPI  Lynsey Sharpe is a 19 year old female with a past medical history significant for pulmonary granuloma and Sjogren's syndrome who presents to the Emergency Department for evaluation of ongoing cough and chest pain. Patient states for the past 2-3 months she has had an constant cough throughout the day. She coughs up streaks of blood at times. She endorses pain & chest discomfort when she coughs. She states her chest pain has been going on longer than the cough. She also feels nauseous when she coughs but denies vomiting. Patient also endorses epigastric pain on palpation however, denies this being the reason she is here in the ED. She reports history of lung scarring & chest nodules however, she has never followed up with a provider for this. She does not have a PCP. [facility] in [place] for usual care. She denies chance of pregnancy.     Past Medical History  Past Medical History:   Diagnosis Date    Sjogren's syndrome (H24)      History reviewed. No pertinent surgical history.  No current outpatient medications on file.    No Known Allergies  Family History  History reviewed. No pertinent family history.  Social History   Social History     Tobacco Use    Smoking status: Never    Smokeless tobacco: Never   Substance Use Topics    Alcohol use: Not Currently    Drug use: Yes     Types: Marijuana      Past medical history, past surgical history, medications, allergies, family history, and social history were reviewed with the patient. No additional pertinent items.      A medically appropriate review of systems was performed with pertinent positives and negatives noted in the HPI, and all other systems negative.    Physical Exam   BP: 105/72  Pulse:  "69  Temp: 98  F (36.7  C)  Resp: 16  Height: 157.5 cm (5' 2\")  Weight: 42.7 kg (94 lb 3.2 oz)  SpO2: 100 %  Physical Exam  Constitutional:       General: She is not in acute distress.     Appearance: Normal appearance. She is not diaphoretic.   HENT:      Head: Atraumatic.      Mouth/Throat:      Mouth: Mucous membranes are moist.   Eyes:      General: No scleral icterus.     Conjunctiva/sclera: Conjunctivae normal.   Cardiovascular:      Rate and Rhythm: Normal rate.      Heart sounds: Normal heart sounds.   Pulmonary:      Effort: No respiratory distress.      Breath sounds: Normal breath sounds.   Chest:      Chest wall: Tenderness present.   Abdominal:      General: Abdomen is flat.   Musculoskeletal:      Cervical back: Neck supple.   Skin:     General: Skin is warm.      Findings: No rash.   Neurological:      General: No focal deficit present.      Mental Status: She is alert and oriented to person, place, and time.      Sensory: No sensory deficit.      Motor: No weakness.      Coordination: Coordination normal.           ED Course, Procedures, & Data      Procedures       EKG Interpretation:      Interpreted by Wayne Monahan MD  Time reviewed:on arrival   Symptoms at time of EKG: chest pain   Rhythm: normal sinus   Rate: normal  Axis: NORMAL  Ectopy: none  Conduction: normal  ST Segments/ T Waves: No ST-T wave changes  Q Waves: none  Comparison to prior: No old EKG available    Clinical Impression: normal EKG         Results for orders placed or performed during the hospital encounter of 04/19/24   XR Chest 2 Views     Status: None    Narrative    CHEST TWO VIEWS 4/19/2024 10:07 AM     HISTORY: Chest pain, cough.    COMPARISON: None.       Impression    IMPRESSION: There are no acute infiltrates. The cardiac silhouette is  not enlarged. Pulmonary vasculature is unremarkable.    MUNA CASTILLO MD         SYSTEM ID:  [id number]   US Abdomen Limited (RUQ)     Status: None    Narrative    US ABDOMEN " LIMITED 4/19/2024 10:08 AM    CLINICAL HISTORY: Right upper quadrant tenderness.    TECHNIQUE: Limited abdominal ultrasound.    COMPARISON: None.    FINDINGS:    GALLBLADDER: No gallstones, wall thickening, or pericholecystic fluid.  Negative sonographic Dumont's sign.    BILE DUCTS: There is no biliary dilatation. The common duct measures 2  mm.    LIVER: Unremarkable where seen.    RIGHT KIDNEY: No hydronephrosis.    PANCREAS: The visualized portions of the pancreas are normal.    No ascites.      Impression    IMPRESSION:  1.  No acute process demonstrated.     MUNA CASTILLO MD         SYSTEM ID:  [id number]   CT Chest Pulmonary Embolism w Contrast     Status: None    Narrative    CT CHEST PULMONARY EMBOLISM WITH CONTRAST  4/19/2024 12:56 PM    CLINICAL HISTORY: Hemoptysis, elevated D-dimer.    TECHNIQUE: CT angiogram chest during arterial phase injection IV  contrast. 2D and 3D MIP reconstructions were performed by the CT  technologist. Dose reduction techniques were used.     CONTRAST: 50 mL Isovue 370    COMPARISON: Chest x-ray 4/19/2024.    FINDINGS:  ANGIOGRAM CHEST: Pulmonary arteries are normal caliber and negative  for pulmonary emboli. Thoracic aorta is negative for dissection. No CT  evidence of right heart strain.    LUNGS AND PLEURA: No effusions. No pneumothorax. No acute airspace  consolidation. There is a focal 1.1 cm nodular calcified opacity at  the anterior right lower lobe infrahilar region with some internal  cavitation that could be distended peripheral airways. This is seen on  series 8 image 125.    MEDIASTINUM/AXILLAE: No acute mediastinal abnormality. Calcified small  lymph nodes noted at the right subcarinal region. Small calcified  right hilar lymph node. No mediastinal focal fluid collection.    CORONARY ARTERY CALCIFICATION: None.    UPPER ABDOMEN: No acute upper abdominal abnormality.    MUSCULOSKELETAL: No focal bone lesion.      Impression    IMPRESSION:  1.  Nodule with  calcification and central cavitation along the  anterior right lower lobe infrahilar region. This could represent an  old granulomatous process. Broncholiths within distended small airways  is also a possibility.  2.  No acute airspace consolidation.  3.  No evidence for pulmonary embolism.    DANETTE VINCENT MD         SYSTEM ID:  [id number]   Comprehensive metabolic panel     Status: Normal   Result Value Ref Range    Sodium 137 135 - 145 mmol/L    Potassium 4.1 3.4 - 5.3 mmol/L    Carbon Dioxide (CO2) 22 22 - 29 mmol/L    Anion Gap 11 7 - 15 mmol/L    Urea Nitrogen 6.0 6.0 - 20.0 mg/dL    Creatinine 0.77 0.51 - 0.95 mg/dL    GFR Estimate >90 >60 mL/min/1.73m2    Calcium 9.3 8.6 - 10.0 mg/dL    Chloride 104 98 - 107 mmol/L    Glucose 77 70 - 99 mg/dL    Alkaline Phosphatase 55 40 - 150 U/L    AST 34 0 - 45 U/L    ALT 11 0 - 50 U/L    Protein Total 7.0 6.4 - 8.3 g/dL    Albumin 4.1 3.5 - 5.2 g/dL    Bilirubin Total 0.6 <=1.2 mg/dL   Lipase     Status: Normal   Result Value Ref Range    Lipase 24 13 - 60 U/L   Troponin T, High Sensitivity     Status: Normal   Result Value Ref Range    Troponin T, High Sensitivity <6 <=14 ng/L   D dimer quantitative     Status: Abnormal   Result Value Ref Range    D-Dimer Quantitative 0.62 (H) 0.00 - 0.50 ug/mL FEU    Narrative    This D-dimer assay is intended for use in conjunction with a clinical pretest probability assessment model to exclude pulmonary embolism (PE) and deep venous thrombosis (DVT) in outpatients suspected of PE or DVT. The cut-off value is 0.50 ug/mL FEU.   UA with Microscopic reflex to Culture     Status: Abnormal    Specimen: Urine, Clean Catch   Result Value Ref Range    Color Urine Yellow Colorless, Straw, Light Yellow, Yellow    Appearance Urine Clear Clear    Glucose Urine Negative Negative mg/dL    Bilirubin Urine Negative Negative    Ketones Urine Negative Negative mg/dL    Specific Gravity Urine 1.021 1.003 - 1.035    Blood Urine Negative Negative    pH Urine  6.5 5.0 - 7.0    Protein Albumin Urine Negative Negative mg/dL    Urobilinogen Urine Normal Normal, 2.0 mg/dL    Nitrite Urine Negative Negative    Leukocyte Esterase Urine Trace (A) Negative    Bacteria Urine Few (A) None Seen /HPF    Mucus Urine Present (A) None Seen /LPF    RBC Urine <1 <=2 /HPF    WBC Urine 2 <=5 /HPF    Squamous Epithelials Urine 7 (H) <=1 /HPF    Transitional Epithelials Urine <1 <=1 /HPF    Narrative    Urine Culture not indicated   CBC with platelets and differential     Status: Abnormal   Result Value Ref Range    WBC Count 6.9 4.0 - 11.0 10e3/uL    RBC Count 5.00 3.80 - 5.20 10e6/uL    Hemoglobin 14.4 11.7 - 15.7 g/dL    Hematocrit 43.9 35.0 - 47.0 %    MCV 88 78 - 100 fL    MCH 28.8 26.5 - 33.0 pg    MCHC 32.8 31.5 - 36.5 g/dL    RDW 13.7 10.0 - 15.0 %    Platelet Count 142 (L) 150 - 450 10e3/uL    % Neutrophils 51 %    % Lymphocytes 42 %    % Monocytes 4 %    % Eosinophils 2 %    % Basophils 1 %    % Immature Granulocytes 0 %    NRBCs per 100 WBC 0 <1 /100    Absolute Neutrophils 3.5 1.6 - 8.3 10e3/uL    Absolute Lymphocytes 2.9 0.8 - 5.3 10e3/uL    Absolute Monocytes 0.3 0.0 - 1.3 10e3/uL    Absolute Eosinophils 0.2 0.0 - 0.7 10e3/uL    Absolute Basophils 0.1 0.0 - 0.2 10e3/uL    Absolute Immature Granulocytes 0.0 <=0.4 10e3/uL    Absolute NRBCs 0.0 10e3/uL   Extra Tube     Status: None    Narrative    The following orders were created for panel order Extra Tube.  Procedure                               Abnormality         Status                     ---------                               -----------         ------                     Extra Red Top Tube[[id number]]                               Final result                 Please view results for these tests on the individual orders.   Extra Red Top Tube     Status: None   Result Value Ref Range    Hold Specimen [facility]    EKG 12-lead, tracing only     Status: None   Result Value Ref Range    Systolic Blood Pressure  mmHg    Diastolic Blood  Pressure  mmHg    Ventricular Rate 60 BPM    Atrial Rate 60 BPM    OH Interval 142 ms    QRS Duration 74 ms     ms    QTc 416 ms    P Axis 116 degrees    R AXIS 131 degrees    T Axis 169 degrees    Interpretation ECG       ** Suspect arm lead reversal, interpretation assumes no reversal  Sinus rhythm with sinus arrhythmia  Right axis deviation  T wave abnormality, consider inferior ischemia  Abnormal ECG  Unconfirmed report - interpretation of this ECG is computer generated - see medical record for final interpretation  Confirmed by - EMERGENCY ROOM, PHYSICIAN ([id number]),  TIMOTHY STOCKTON ([id number]) on 4/19/2024 9:51:04 PM     hCG qual urine POCT     Status: Normal   Result Value Ref Range    HCG Qual Urine Negative Negative    Internal QC Check POCT Valid Valid    POCT Kit Lot Number [id number]     POCT Kit Expiration Date 10/25/2025    CBC with platelets differential     Status: Abnormal    Narrative    The following orders were created for panel order CBC with platelets differential.  Procedure                               Abnormality         Status                     ---------                               -----------         ------                     CBC with platelets and d...[[id number]]  Abnormal            Final result                 Please view results for these tests on the individual orders.     Medications   iopamidol (ISOVUE-370) solution 100 mL (50 mLs Intravenous $Given 4/19/24 1246)   sodium chloride 0.9 % bag 500mL for CT scan flush use (72 mLs As instructed $Given 4/19/24 1248)     Labs Ordered and Resulted from Time of ED Arrival to Time of ED Departure   D DIMER QUANTITATIVE - Abnormal       Result Value    D-Dimer Quantitative 0.62 (*)    ROUTINE UA WITH MICROSCOPIC REFLEX TO CULTURE - Abnormal    Color Urine Yellow      Appearance Urine Clear      Glucose Urine Negative      Bilirubin Urine Negative      Ketones Urine Negative      Specific Gravity Urine 1.021      Blood Urine Negative       pH Urine 6.5      Protein Albumin Urine Negative      Urobilinogen Urine Normal      Nitrite Urine Negative      Leukocyte Esterase Urine Trace (*)     Bacteria Urine Few (*)     Mucus Urine Present (*)     RBC Urine <1      WBC Urine 2      Squamous Epithelials Urine 7 (*)     Transitional Epithelials Urine <1     CBC WITH PLATELETS AND DIFFERENTIAL - Abnormal    WBC Count 6.9      RBC Count 5.00      Hemoglobin 14.4      Hematocrit 43.9      MCV 88      MCH 28.8      MCHC 32.8      RDW 13.7      Platelet Count 142 (*)     % Neutrophils 51      % Lymphocytes 42      % Monocytes 4      % Eosinophils 2      % Basophils 1      % Immature Granulocytes 0      NRBCs per 100 WBC 0      Absolute Neutrophils 3.5      Absolute Lymphocytes 2.9      Absolute Monocytes 0.3      Absolute Eosinophils 0.2      Absolute Basophils 0.1      Absolute Immature Granulocytes 0.0      Absolute NRBCs 0.0     COMPREHENSIVE METABOLIC PANEL - Normal    Sodium 137      Potassium 4.1      Carbon Dioxide (CO2) 22      Anion Gap 11      Urea Nitrogen 6.0      Creatinine 0.77      GFR Estimate >90      Calcium 9.3      Chloride 104      Glucose 77      Alkaline Phosphatase 55      AST 34      ALT 11      Protein Total 7.0      Albumin 4.1      Bilirubin Total 0.6     LIPASE - Normal    Lipase 24     TROPONIN T, HIGH SENSITIVITY - Normal    Troponin T, High Sensitivity <6     HCG QUALITATIVE URINE POCT - Normal    HCG Qual Urine Negative      Internal QC Check POCT Valid      POCT Kit Lot Number [id number]      POCT Kit Expiration Date 10/25/2025       CT Chest Pulmonary Embolism w Contrast   Final Result   IMPRESSION:   1.  Nodule with calcification and central cavitation along the   anterior right lower lobe infrahilar region. This could represent an   old granulomatous process. Broncholiths within distended small airways   is also a possibility.   2.  No acute airspace consolidation.   3.  No evidence for pulmonary embolism.      DANETTE VINCENT MD             SYSTEM ID:  [id number]      US Abdomen Limited (RUQ)   Final Result   IMPRESSION:   1.  No acute process demonstrated.       MUNA CASTILLO MD            SYSTEM ID:  [id number]      XR Chest 2 Views   Final Result   IMPRESSION: There are no acute infiltrates. The cardiac silhouette is   not enlarged. Pulmonary vasculature is unremarkable.      MUNA CASTILLO MD            SYSTEM ID:  [id number]             Critical care was not performed.     Medical Decision Making  The patient's presentation was of moderate complexity (an acute illness with systemic symptoms).    The patient's evaluation involved:  ordering and/or review of 3+ test(s) in this encounter (see separate area of note for details) was also discussed with independent practitioner from pulmonology with respect to findings on CT and any need for further testing or evaluation.    The patient's management necessitated only low risk treatment.    Assessment & Plan        I have reviewed the nursing notes. I have reviewed the findings, diagnosis, plan and need for follow up with the patient.    There are no discharge medications for this patient.      Final diagnoses:   Hemoptysis   Chronic cough   Lung granuloma (H)   I, IVELISSE WALLACE, am serving as a trained medical scribe to document services personally performed by Wayne Monahan MD, based on the provider's statements to me.      IWayne MD, was physically present and have reviewed and verified the accuracy of this note documented by IVELISSE Monahan MD  [facility] EMERGENCY DEPARTMENT  4/19/2024        Wayne Monahan MD  04/23/24 5803

## 2024-04-22 ENCOUNTER — PRE VISIT (OUTPATIENT)
Dept: OTHER | Age: 19
End: 2024-04-22

## 2024-04-22 NOTE — TELEPHONE ENCOUNTER
RECORDS STATUS - ALL OTHER DIAGNOSIS      Action April 22, 2024 6:45 AM    Action Taken - Req is faxed to [facility] for img.  - Req is faxed to [facility] for PFT report.     April 24, 2024 8:38 AM:  - Receive fax back from [facility], no PFT report. Called [facility] and spoke to a rep, relay what I receive from back, she double checked and found the PFT report. She will fax the report over.     9:11 AM:  - Receive PFT report, faxed to HIM and emailed to NN.     Imaging Received  April 22, 2024 12:50 PM    Action: Images from [facility] received and resolved to PACS.     RECORDS NEEDED:    Last FIVE years CHEST imaging pushed to PACS from [facility]  1/11/2023:  PFT's from [facility]   RECORDS RECEIVED FROM: Epic   DATE RECEIVED:    NOTES STATUS DETAILS   OFFICE NOTE from referring provider Epic 4/19/24: Dr. Wayne Monahan   OFFICE NOTE from other specialist Epic 3/21/23: HÉCTOR Garcia CNP   DISCHARGE REPORT from the ER [facility] 4/19/24: [facility] ED   PFT (Req from NM) 1/11/23   MEDICATION LIST [facility]    LABS     ANYTHING RELATED TO DIAGNOSIS Epic Most recent 4/19/24   IMAGING (NEED IMAGES & REPORT)     CT SCANS PACS/ (Img req from [facility]) 4/19/24: CT Chest Pulm Embolism    NM:  8/9/23, 12/23/22: CT Chest   XRAYS  4/19/24, 12/7/22: XR Chest    NM:  11/21/22: XR Chest

## 2024-04-24 DIAGNOSIS — F12.90 MARIJUANA USE: Primary | ICD-10-CM

## 2024-04-24 PROBLEM — M35.00 SJOGREN'S SYNDROME (H): Status: ACTIVE | Noted: 2024-02-08

## 2024-04-24 PROBLEM — O03.4 RETAINED PRODUCTS OF CONCEPTION FOLLOWING ABORTION: Status: ACTIVE | Noted: 2024-04-24

## 2024-04-24 PROBLEM — N93.9 ABNORMAL UTERINE BLEEDING (AUB): Status: ACTIVE | Noted: 2022-05-19

## 2024-04-24 PROBLEM — R63.0 ANOREXIA SYMPTOM: Status: ACTIVE | Noted: 2023-01-16

## 2024-04-24 PROBLEM — R91.8 LUNG NODULE, MULTIPLE: Status: ACTIVE | Noted: 2022-12-27

## 2024-04-24 PROBLEM — F43.10 PTSD (POST-TRAUMATIC STRESS DISORDER): Status: ACTIVE | Noted: 2021-09-23

## 2024-04-24 PROBLEM — J84.10 PULMONARY GRANULOMA (H): Status: ACTIVE | Noted: 2022-12-27

## 2024-04-24 PROBLEM — Z62.810 HISTORY OF SEXUAL ABUSE IN CHILDHOOD: Status: ACTIVE | Noted: 2021-09-23

## 2024-04-24 RX ORDER — PILOCARPINE HYDROCHLORIDE 5 MG/1
5 TABLET, FILM COATED ORAL
COMMUNITY
Start: 2023-06-26

## 2024-04-24 RX ORDER — MEGESTROL ACETATE 40 MG/ML
400 SUSPENSION ORAL
COMMUNITY
Start: 2023-12-13

## 2024-04-24 RX ORDER — FLUTICASONE PROPIONATE 110 UG/1
2 AEROSOL, METERED RESPIRATORY (INHALATION)
COMMUNITY
Start: 2023-03-22

## 2024-04-28 ENCOUNTER — NURSE TRIAGE (OUTPATIENT)
Dept: NURSING | Facility: CLINIC | Age: 19
End: 2024-04-28
Payer: COMMERCIAL

## 2024-04-28 NOTE — TELEPHONE ENCOUNTER
Foul smell from vaginal area x 2 days. Concerned about BV and asks for antibiotics.    No discharge  No pain  No rashes  No bleeding.    SHERRY advised she needs to be seen within 3 days, consult required prior to treatment. She said she's called in the past and was prescribed antibiotics. SHERRY reminded her that called [facility], and recommended she send MyChart message through [facility] PCP. She verbalized understanding. Advised to call PCP tomorrow if she does not hear back.    Sheridan Oh RN/[place] Nurse Advisor      Reason for Disposition   [1] Vaginal odor (bad smell) AND [2] not improved > 3 days following Care Advice    Additional Information   Negative: Sounds like a life-threatening emergency to the triager   Negative: Followed a genital area injury (e.g., vagina, vulva)   Negative: Foreign body in vagina (e.g., tampon)   Negative: Vaginal bleeding is main symptom   Negative: Vaginal discharge is main symptom   Negative: Pain or burning with passing urine (urination) is main symptom   Negative: Menstrual cramps is main symptom   Negative: Abdomen pain is main symptom   Negative: Pubic lice suspected   Negative: Itching or rash of female genital area (e.g., labia, vagina, vulva)   Negative: Pregnant and labor suspected   Negative: Patient sounds very sick or weak to the triager   Negative: [1] SEVERE pain AND [2] not improved 2 hours after pain medicine   Negative: [1] Genital area looks infected (e.g., draining sore, spreading redness) AND [2] fever   Negative: [1] Something is hanging out of the vagina AND [2] can't easily be pushed back inside   Negative: MODERATE-SEVERE itching (i.e., interferes with school, work, or sleep)   Negative: [1] MILD-MODERATE pain AND [2] present > 24 hours  (Exception: Chronic pain.)   Negative: Genital area looks infected (e.g., draining sore, spreading redness)   Negative: Rash with painful tiny water blisters   Negative: [1] Rash (e.g., redness, tiny bumps, sore) of  "genital area AND [2] present > 24 hours   Negative: Tender lump (swelling or \"ball\") at vaginal opening   Negative: [1] Symptoms of a yeast infection (i.e., itchy, white discharge, not bad smelling) AND [2] not improved > 3 days following Care Advice   Negative: [1] Vaginal itching AND [2] not improved > 3 days following Care Advice    Protocols used: Vaginal Symptoms-A-AH    "

## 2024-04-30 ENCOUNTER — OFFICE VISIT (OUTPATIENT)
Dept: PULMONOLOGY | Facility: CLINIC | Age: 19
End: 2024-04-30
Payer: COMMERCIAL

## 2024-04-30 DIAGNOSIS — R91.8 PULMONARY NODULES: ICD-10-CM

## 2024-04-30 PROCEDURE — 94060 EVALUATION OF WHEEZING: CPT | Performed by: INTERNAL MEDICINE

## 2024-04-30 PROCEDURE — 94729 DIFFUSING CAPACITY: CPT | Performed by: INTERNAL MEDICINE

## 2024-04-30 PROCEDURE — 94726 PLETHYSMOGRAPHY LUNG VOLUMES: CPT | Performed by: INTERNAL MEDICINE

## 2024-04-30 NOTE — PROGRESS NOTES
Lynsey Sharpe comes into clinic today at the request of Dr Mcknight , for PFT    Tolerated testing well. No adverse reactions. Left lab in no distress.        CAROLEE MCNEILL

## 2024-05-02 ENCOUNTER — VIRTUAL VISIT (OUTPATIENT)
Dept: PULMONOLOGY | Facility: CLINIC | Age: 19
End: 2024-05-02
Attending: STUDENT IN AN ORGANIZED HEALTH CARE EDUCATION/TRAINING PROGRAM
Payer: COMMERCIAL

## 2024-05-02 VITALS — HEART RATE: 94 BPM | HEIGHT: 62 IN | WEIGHT: 94.6 LBS | BODY MASS INDEX: 17.41 KG/M2

## 2024-05-02 DIAGNOSIS — R05.3 CHRONIC COUGH: ICD-10-CM

## 2024-05-02 DIAGNOSIS — R04.2 HEMOPTYSIS: ICD-10-CM

## 2024-05-02 DIAGNOSIS — J84.10 LUNG GRANULOMA (H): ICD-10-CM

## 2024-05-02 LAB
DLCOCOR-%PRED-PRE: 99 %
DLCOCOR-PRE: 20.6 ML/MIN/MMHG
DLCOUNC-%PRED-PRE: 101 %
DLCOUNC-PRE: 21.21 ML/MIN/MMHG
DLCOUNC-PRED: 20.8 ML/MIN/MMHG
ERV-%PRED-PRE: 70 %
ERV-PRE: 0.83 L
ERV-PRED: 1.17 L
EXPTIME-PRE: 2.58 SEC
FEF2575-%PRED-POST: 37 %
FEF2575-%PRED-PRE: 74 %
FEF2575-POST: 1.35 L/SEC
FEF2575-PRE: 2.67 L/SEC
FEF2575-PRED: 3.57 L/SEC
FEFMAX-%PRED-PRE: 101 %
FEFMAX-PRE: 6.63 L/SEC
FEFMAX-PRED: 6.52 L/SEC
FEV1-%PRED-PRE: 78 %
FEV1-PRE: 2.28 L
FEV1FEV6-PRE: 85 %
FEV1FEV6-PRED: 87 %
FEV1FVC-PRE: 82 %
FEV1FVC-PRED: 90 %
FEV1SVC-PRE: 85 %
FEV1SVC-PRED: 81 %
FIFMAX-PRE: 3.07 L/SEC
FRCPLETH-%PRED-PRE: 105 %
FRCPLETH-PRE: 2.69 L
FRCPLETH-PRED: 2.55 L
FVC-%PRED-PRE: 85 %
FVC-PRE: 2.76 L
FVC-PRED: 3.25 L
IC-%PRED-PRE: 78 %
IC-PRE: 1.85 L
IC-PRED: 2.34 L
RVPLETH-%PRED-PRE: 148 %
RVPLETH-PRE: 1.86 L
RVPLETH-PRED: 1.25 L
TLCPLETH-%PRED-PRE: 98 %
TLCPLETH-PRE: 4.53 L
TLCPLETH-PRED: 4.6 L
VA-%PRED-PRE: 85 %
VA-PRE: 3.7 L
VC-%PRED-PRE: 74 %
VC-PRE: 2.67 L
VC-PRED: 3.59 L

## 2024-05-02 PROCEDURE — 99204 OFFICE O/P NEW MOD 45 MIN: CPT | Mod: 95 | Performed by: STUDENT IN AN ORGANIZED HEALTH CARE EDUCATION/TRAINING PROGRAM

## 2024-05-02 RX ORDER — LIDOCAINE 40 MG/G
CREAM TOPICAL
Status: CANCELLED | OUTPATIENT
Start: 2024-05-02

## 2024-05-02 ASSESSMENT — PAIN SCALES - GENERAL: PAINLEVEL: MILD PAIN (3)

## 2024-05-02 NOTE — LETTER
5/2/2024       RE: Lynsey Sharpe  [address]  [facility] [address]     Dear Colleague,    Thank you for referring your patient, Lynsey Sharpe, to the [facility] at [facility]. Please see a copy of my visit note below.    Virtual Visit Details    Type of service:  Video Visit   Video Start Time: 12:35 PM  Video End Time:12:46 PM    Originating Location (pt. Location): Home    Distant Location (provider location):  On-site  Platform used for Video Visit: [facility]    LUNG NODULE & INTERVENTIONAL PULMONARY CLINIC  [facility]    Lynsey Sharpe MRN# [id number]   Age: 19 year old YOB: 2005     Reason for Consultation: Lung nodule    Requesting Physician: Wayne Monahan MD  [address] [place] [address]     Assessment and Plan:    Lynsey Sharpe is a 19 year old female who presents for evaluation of possible broncholith.     I independently reviewed their CT scan from 4/19/24 which reveals multiple findings. She has calcified subcarinal LN. Additionally, she has a calcification in the RLL anterior segment. This could be the source of her hemoptysis.     She needs an airway exam to evaluate the situation and to take pictures. No plans for removal of broncholith in endoscopy, this is purely diagnostic.     Perform bronchoscopy with RLL BAL. Send for micro and cytology.     May end up needing referral to thoracic surgery.       Patient indicated understanding and agreed to the plan of care. All questions answered.     Madhav Shaw DO   of Medicine  Interventional Pulmonology  Department of Pulmonary, Allergy, Critical Care and Sleep Medicine   [facility]     History:  Lynsey Sharpe is a 19 year old female who presents for evaluation of a broncholith.  Cough? yes, having hemoptysis. Coughs up blood 1-2x per month. Most she has  "coughed up is a quarter size amount.   Shortness of breath? Yes, ongoing since last year.   Smoking history: none  Prior history of lung problems: No  Family history: Yes, mother had cervical cancer.   Exposure to fungus/mold: Yes exposed to mold in her apartment.   Exposure to tuberculosis: No  Radon exposure: no  Asbestos exposure: no  Uranium exposure: No  Immunosuppressed? No  Difficulty swallowing? No  Weight loss? Yes. Weight changes up and down over the past two years.   Night sweats? Yes, ongoing for 4-5 months.   Fever or chills? No    Medications:  Current Outpatient Medications   Medication Sig Dispense Refill     fluticasone (FLOVENT HFA) 110 MCG/ACT inhaler Inhale 2 puffs into the lungs       amitriptyline (ELAVIL) 25 MG tablet Take 25 mg by mouth       megestrol (MEGACE) 40 MG/ML suspension Take 400 mg by mouth       pilocarpine (SALAGEN) 5 MG tablet Take 5 mg by mouth       No current facility-administered medications for this visit.         Physical exam:  Pulse 94   Ht 1.575 m (5' 2\")   Wt 42.9 kg (94 lb 9.6 oz)   LMP  (LMP Unknown)   BMI 17.30 kg/m    Wt Readings from Last 4 Encounters:   05/02/24 42.9 kg (94 lb 9.6 oz)   04/19/24 42.7 kg (94 lb 3.2 oz)   02/01/24 41.4 kg (91 lb 4.8 oz) (<1%, Z= -2.68)*   01/24/24 41.4 kg (91 lb 3.2 oz) (<1%, Z= -2.69)*     * Growth percentiles are based on CDC (Girls, 2-20 Years) data.     General: Well appearing, non labored breathing  Neuro: Answering questions appropriately  Psych: Normal affect   Again, thank you for allowing me to participate in the care of your patient.      Sincerely,    Madhav Shaw, DO    "

## 2024-05-02 NOTE — NURSING NOTE
Is the patient currently in the state of [place]? YES    Visit mode:VIDEO    If the visit is dropped, the patient can be reconnected by: VIDEO VISIT: Text to cell phone:   Telephone Information:   Mobile [phone]       Will anyone else be joining the visit? NO  (If patient encounters technical issues they should call [phone]    How would you like to obtain your AVS? MyChart    Are changes needed to the allergy or medication list? No, Pt stated no changes to allergies, and Pt stated no med changes    Are refills needed on medications prescribed by this physician?     Reason for visit: Consult    Stacy KC

## 2024-05-02 NOTE — PROGRESS NOTES
Virtual Visit Details    Type of service:  Video Visit   Video Start Time: 12:35 PM  Video End Time:12:46 PM    Originating Location (pt. Location): Home    Distant Location (provider location):  On-site  Platform used for Video Visit: [facility]    LUNG NODULE & INTERVENTIONAL PULMONARY CLINIC  [facility]    Lynsey Sharpe MRN# [id number]   Age: 19 year old YOB: 2005     Reason for Consultation: Lung nodule    Requesting Physician: Wayne Monahan MD  [address] [place] [address]     Assessment and Plan:    Lynsey Sharpe is a 19 year old female who presents for evaluation of possible broncholith.     I independently reviewed their CT scan from 4/19/24 which reveals multiple findings. She has calcified subcarinal LN. Additionally, she has a calcification in the RLL anterior segment. This could be the source of her hemoptysis.     She needs an airway exam to evaluate the situation and to take pictures. No plans for removal of broncholith in endoscopy, this is purely diagnostic.     Perform bronchoscopy with RLL BAL. Send for micro and cytology.     May end up needing referral to thoracic surgery.       Patient indicated understanding and agreed to the plan of care. All questions answered.     Madhav Shaw DO   of Medicine  Interventional Pulmonology  Department of Pulmonary, Allergy, Critical Care and Sleep Medicine   [facility]     History:  Lynsey Sharpe is a 19 year old female who presents for evaluation of a broncholith.  Cough? yes, having hemoptysis. Coughs up blood 1-2x per month. Most she has coughed up is a quarter size amount.   Shortness of breath? Yes, ongoing since last year.   Smoking history: none  Prior history of lung problems: No  Family history: Yes, mother had cervical cancer.   Exposure to fungus/mold: Yes exposed to mold in her apartment.   Exposure to tuberculosis: No  Radon exposure:  "no  Asbestos exposure: no  Uranium exposure: No  Immunosuppressed? No  Difficulty swallowing? No  Weight loss? Yes. Weight changes up and down over the past two years.   Night sweats? Yes, ongoing for 4-5 months.   Fever or chills? No    Medications:  Current Outpatient Medications   Medication Sig Dispense Refill    fluticasone (FLOVENT HFA) 110 MCG/ACT inhaler Inhale 2 puffs into the lungs      amitriptyline (ELAVIL) 25 MG tablet Take 25 mg by mouth      megestrol (MEGACE) 40 MG/ML suspension Take 400 mg by mouth      pilocarpine (SALAGEN) 5 MG tablet Take 5 mg by mouth       No current facility-administered medications for this visit.         Physical exam:  Pulse 94   Ht 1.575 m (5' 2\")   Wt 42.9 kg (94 lb 9.6 oz)   LMP  (LMP Unknown)   BMI 17.30 kg/m    Wt Readings from Last 4 Encounters:   05/02/24 42.9 kg (94 lb 9.6 oz)   04/19/24 42.7 kg (94 lb 3.2 oz)   02/01/24 41.4 kg (91 lb 4.8 oz) (<1%, Z= -2.68)*   01/24/24 41.4 kg (91 lb 3.2 oz) (<1%, Z= -2.69)*     * Growth percentiles are based on CDC (Girls, 2-20 Years) data.     General: Well appearing, non labored breathing  Neuro: Answering questions appropriately  Psych: Normal affect       "

## 2024-05-13 ENCOUNTER — HOSPITAL ENCOUNTER (OUTPATIENT)
Facility: CLINIC | Age: 19
Discharge: HOME | End: 2024-05-13
Attending: INTERNAL MEDICINE | Admitting: INTERNAL MEDICINE
Payer: COMMERCIAL

## 2024-05-13 VITALS
RESPIRATION RATE: 18 BRPM | OXYGEN SATURATION: 100 % | DIASTOLIC BLOOD PRESSURE: 78 MMHG | SYSTOLIC BLOOD PRESSURE: 110 MMHG | HEART RATE: 53 BPM

## 2024-05-13 PROCEDURE — 250N000009 HC RX 250: Performed by: INTERNAL MEDICINE

## 2024-05-13 PROCEDURE — 31624 DX BRONCHOSCOPE/LAVAGE: CPT | Performed by: INTERNAL MEDICINE

## 2024-05-13 PROCEDURE — 250N000011 HC RX IP 250 OP 636: Performed by: INTERNAL MEDICINE

## 2024-05-13 PROCEDURE — 31622 DX BRONCHOSCOPE/WASH: CPT | Performed by: INTERNAL MEDICINE

## 2024-05-13 PROCEDURE — 999N000099 HC STATISTIC MODERATE SEDATION < 10 MIN: Performed by: INTERNAL MEDICINE

## 2024-05-13 RX ORDER — LIDOCAINE 40 MG/G
CREAM TOPICAL
Status: DISCONTINUED | OUTPATIENT
Start: 2024-05-13 | End: 2024-05-13 | Stop reason: HOSPADM

## 2024-05-13 RX ORDER — LIDOCAINE HYDROCHLORIDE 40 MG/ML
INJECTION, SOLUTION RETROBULBAR PRN
Status: DISCONTINUED | OUTPATIENT
Start: 2024-05-13 | End: 2024-05-13 | Stop reason: HOSPADM

## 2024-05-13 RX ORDER — LIDOCAINE HYDROCHLORIDE 10 MG/ML
INJECTION, SOLUTION INFILTRATION; PERINEURAL PRN
Status: DISCONTINUED | OUTPATIENT
Start: 2024-05-13 | End: 2024-05-13 | Stop reason: HOSPADM

## 2024-05-13 RX ORDER — FENTANYL CITRATE 50 UG/ML
INJECTION, SOLUTION INTRAMUSCULAR; INTRAVENOUS PRN
Status: DISCONTINUED | OUTPATIENT
Start: 2024-05-13 | End: 2024-05-13 | Stop reason: HOSPADM

## 2024-05-13 ASSESSMENT — ACTIVITIES OF DAILY LIVING (ADL)
ADLS_ACUITY_SCORE: 35
ADLS_ACUITY_SCORE: 35

## 2024-05-13 NOTE — OR NURSING
Procedure: bronch without lavage  Sedation: conscious sedation   Specimens: none  O2: ra  Tolerated procedure: well  Pt to recovery area in stable condition accompanied by RN, bedside report given to recovery RN  Other:  n/a    All belongings with patient at time of transfer.

## 2024-05-13 NOTE — DISCHARGE INSTRUCTIONS
If you have questions, call:  Monday to Friday, 8 a.m. to 4:30 p.m.  Adult Pulmonology Clinic: [phone]    After hours:  Hospital: [phone] (Ask for the pulmonary fellow on call)Post Bronchoscopy Patient Instructions:    May 13, 2024  Lynsey Sharpe    Your procedure was completed (bronchoscopy with inspection) without any immediate complications.  The spot on your CT looks like it is caused by a scar from an old infection. I will review the images with Dr. Shaw.    You may cough up scant amount of blood for the next 12-24 hours. If you have excessive cough with blood, chest pain, shortness of breath, please report to the closest emergency room.    You may experience low grade (less than 100.5 F) fever next 24 hours, if so, you can take Tylenol. If the fever persists more than 24 hours, please contact to our office or your primary care provider.    You may resume your diet as it was prior to procedure.    You may resume your medications after the procedure unless you are instructed to do differently.     Please follow instructions from the nursing staff upon discharge in terms of activity. In general, you should avoid any attention or motor skill requiring activities (e.g., driving or operating any motorized vehicle) for 24 hours as you might be still under the effect of sedation medications. Please make sure an adult to accompany you next 24 hours.     Should you have any question, please do not hesitate to call our office.    Joel Clements MD

## 2024-05-13 NOTE — PROCEDURES
INTERVENTIONAL PULMONOLOGY       Procedure(s):    A flexible bronchoscopy  Airway exam    Indication:  possible broncholith    Attending of Record:  Joel Clements MD    Interventional Pulmonary Fellow   None    Trainees Present:   None     Medications:    1.5 mg versed  100 mcg fentanyl    Sedation Time:   Total sedation time was Choose Duration: 7 minutes of continuous bedside 1:1 monitoring.    Time Out:  Performed    The patient's medical record has been reviewed.  The indication for the procedure was reviewed.  The necessary history and physical examination was performed and reviewed.  The risks, benefits and alternatives of the procedure were discussed with the the patient in detail and she had the opportunity to ask questions.  I discussed in particular the potential complications including risks of minor or life-threatening bleeding and/or infection, respiratory failure, vocal cord trauma / paralysis, pneumothorax, and discomfort. Sedation risks were also discussed including abnormal heart rhythms, low blood pressure, and respiratory failure. All questions were answered to the best of my ability.  Verbal and written informed consent was obtained.  The proposed procedure and the patient's identification were verified prior to the procedure by the physician and the nurse, respiratory therapist.    The patient was assessed for the adequacy for the procedure and to receive medications.   Mental Status:  Alert and oriented x 3  Airway examination:  Class I (Complete visualization of soft palate)  Pulmonary:  Clear to ausculation bilaterally  CV:  RRR, no murmurs or gallops  ASA Grade:  (I)  Normal healthy patient    After clinical evaluation and reviewing the indication, risks, alternatives and benefits of the procedure the patient was deemed to be in satisfactory condition to undergo the procedure.      Immediately before administration of medications the patient was re-assessed for adequacy to receive  sedatives including the heart rate, respiratory rate, mental status, oxygen saturation, blood pressure and adequacy of pulmonary ventilation. These same parameters were continuously monitored throughout the procedure.    A Tuberculosis risk assessment was performed:  The patient has no known RISK of Tuberculosis    The procedure was performed in a negative airflow room: Yes    Maneuvers / Procedure:      A Flexible  bronchoscope was used for the procedure. The flexible bronchoscope was inserted through the mouth observing the epiglottis and posterior pharyngeal structures. The VC were anesthetized with 1% and 4% lidocaine. The scope was then advanced throught the cords and into the trachea.      Airway Examination: A complete airway examination was performed from the distal trachea to the subsegmental level in each lobe of both lungs.  Pertinent findings include at site of broncholith there is a scarred over airway. It appears that she had an infection affecting the anterior segment of her right lower lobe and is has scarred closed. Based on CT there is no viable parenchyma beyond this area.         Relevant Pictures  Benign appearing scar at site of anterior segment of RLL      Recommendations:     -->  not concerning for cancer or infection  -->  I will review with Dr. Shaw. It seems unlikely that this requires further follow up.      Joel Clements MD

## 2024-05-30 ENCOUNTER — VIRTUAL VISIT (OUTPATIENT)
Dept: PULMONOLOGY | Facility: CLINIC | Age: 19
End: 2024-05-30
Attending: STUDENT IN AN ORGANIZED HEALTH CARE EDUCATION/TRAINING PROGRAM
Payer: COMMERCIAL

## 2024-05-30 ENCOUNTER — VIRTUAL VISIT (OUTPATIENT)
Dept: FAMILY MEDICINE | Facility: CLINIC | Age: 19
End: 2024-05-30
Attending: STUDENT IN AN ORGANIZED HEALTH CARE EDUCATION/TRAINING PROGRAM
Payer: COMMERCIAL

## 2024-05-30 VITALS — BODY MASS INDEX: 17.85 KG/M2 | HEIGHT: 62 IN | WEIGHT: 97 LBS

## 2024-05-30 DIAGNOSIS — R68.89 CHANGE IN WEIGHT: Primary | ICD-10-CM

## 2024-05-30 DIAGNOSIS — R68.89 CHANGE IN WEIGHT: ICD-10-CM

## 2024-05-30 DIAGNOSIS — F12.20 CANNABIS DEPENDENCE (H): Primary | ICD-10-CM

## 2024-05-30 DIAGNOSIS — R91.1 LUNG NODULE: Primary | ICD-10-CM

## 2024-05-30 DIAGNOSIS — K21.9 GASTROESOPHAGEAL REFLUX DISEASE WITHOUT ESOPHAGITIS: ICD-10-CM

## 2024-05-30 DIAGNOSIS — R61 GENERALIZED HYPERHIDROSIS: ICD-10-CM

## 2024-05-30 PROCEDURE — 99214 OFFICE O/P EST MOD 30 MIN: CPT | Mod: 95

## 2024-05-30 PROCEDURE — 99213 OFFICE O/P EST LOW 20 MIN: CPT | Mod: 95 | Performed by: STUDENT IN AN ORGANIZED HEALTH CARE EDUCATION/TRAINING PROGRAM

## 2024-05-30 ASSESSMENT — PAIN SCALES - GENERAL: PAINLEVEL: NO PAIN (0)

## 2024-05-30 NOTE — PROGRESS NOTES
Virtual Visit Details    Type of service:  Video Visit   Video Start Time: 7:19a  Video End Time: 724a    Originating Location (pt. Location): Home    Distant Location (provider location):  On-site  Platform used for Video Visit: [facility]    LUNG NODULE & INTERVENTIONAL PULMONARY CLINIC  [facility]    Lynsey Sharpe MRN# [id number]   Age: 19 year old YOB: 2005     Reason for Consultation: Follow up for hemoptysis    Requesting Physician: Madhav Shaw DO  [address] [address]     Assessment and Plan:    Lynsey Sharpe is a 19 year old female who presents for evaluation of hemoptysis    I saw her earlier this month for hemoptysis and sent her for a bronchoscopy. BAL was not performed. No infectious or cancer seen on her bronch.     Clinically, her hemoptysis is improving and now she is producing very small amounts infrequently.     I am hopeful this will resolve on its own. If not, and this persists or worsens, I have instructed her to let me know so I can refer her to a thoracic surgeon to see about possible removal of calcification/broncholith in her RLL.     She is also experiencing fluctuating weight changes. Currently doesn't have a PCP. Will refer her one. Female preferred.     Patient indicated understanding and agreed to the plan of care. All questions answered.     Madhav Shaw DO   of Medicine  Interventional Pulmonology  Department of Pulmonary, Allergy, Critical Care and Sleep Medicine   [facility]     History:  Lynsey Sharpe is a 19 year old female who presents for follow up evaluation of hemoptysis.   No changes to her health since I last saw her.   Improved hemoptysis, almost completely resolved.   No fever.       Medications:  Current Outpatient Medications   Medication Sig Dispense Refill    amitriptyline (ELAVIL) 25 MG tablet Take 25 mg by mouth      fluticasone (FLOVENT HFA) 110  "MCG/ACT inhaler Inhale 2 puffs into the lungs      megestrol (MEGACE) 40 MG/ML suspension Take 400 mg by mouth      pilocarpine (SALAGEN) 5 MG tablet Take 5 mg by mouth       No current facility-administered medications for this visit.         Physical exam:  Ht 1.575 m (5' 2\")   Wt 44 kg (97 lb)   LMP  (LMP Unknown)   BMI 17.74 kg/m    Wt Readings from Last 4 Encounters:   05/30/24 44 kg (97 lb)   05/02/24 42.9 kg (94 lb 9.6 oz)   04/19/24 42.7 kg (94 lb 3.2 oz)   02/01/24 41.4 kg (91 lb 4.8 oz) (<1%, Z= -2.68)*     * Growth percentiles are based on CDC (Girls, 2-20 Years) data.     General: Well appearing, nonlabored breathing  Neuro: Answering questions appropriately  Psych: Normal affect       "

## 2024-05-30 NOTE — LETTER
5/30/2024       RE: Lynsey Sharpe  [address]  [facility] [address]     Dear Colleague,    Thank you for referring your patient, Lynsey Sharpe, to the [facility] at [facility]. Please see a copy of my visit note below.        LUNG NODULE & INTERVENTIONAL PULMONARY CLINIC  [facility]    Lynsey Sharpe MRN# [id number]   Age: 19 year old YOB: 2005     Reason for Consultation: Follow up for hemoptysis    Requesting Physician: Madhav Shaw DO  [address] [facility] [address]  [place] [address]     Assessment and Plan:    Lynsey Sharpe is a 19 year old female who presents for evaluation of hemoptysis    I saw her earlier this month for hemoptysis and sent her for a bronchoscopy. BAL was not performed. No infectious or cancer seen on her bronch.     Clinically, her hemoptysis is improving and now she is producing very small amounts infrequently.     I am hopeful this will resolve on its own. If not, and this persists or worsens, I have instructed her to let me know so I can refer her to a thoracic surgeon to see about possible removal of calcification/broncholith in her RLL.     She is also experiencing fluctuating weight changes. Currently doesn't have a PCP. Will refer her one. Female preferred.     Patient indicated understanding and agreed to the plan of care. All questions answered.     Madhav Shaw DO   of Medicine  Interventional Pulmonology  Department of Pulmonary, Allergy, Critical Care and Sleep Medicine   [facility]     History:  Lynsey Sharpe is a 19 year old female who presents for follow up evaluation of hemoptysis.   No changes to her health since I last saw her.   Improved hemoptysis, almost completely resolved.   No fever.       Medications:  Current Outpatient Medications   Medication Sig Dispense Refill    amitriptyline  "(ELAVIL) 25 MG tablet Take 25 mg by mouth      fluticasone (FLOVENT HFA) 110 MCG/ACT inhaler Inhale 2 puffs into the lungs      megestrol (MEGACE) 40 MG/ML suspension Take 400 mg by mouth      pilocarpine (SALAGEN) 5 MG tablet Take 5 mg by mouth       No current facility-administered medications for this visit.         Physical exam:  Ht 1.575 m (5' 2\")   Wt 44 kg (97 lb)   LMP  (LMP Unknown)   BMI 17.74 kg/m    Wt Readings from Last 4 Encounters:   05/30/24 44 kg (97 lb)   05/02/24 42.9 kg (94 lb 9.6 oz)   04/19/24 42.7 kg (94 lb 3.2 oz)   02/01/24 41.4 kg (91 lb 4.8 oz) (<1%, Z= -2.68)*     * Growth percentiles are based on [facility] (Girls, 2-20 Years) data.     General: Well appearing, nonlabored breathing  Neuro: Answering questions appropriately  Psych: Normal affect   Again, thank you for allowing me to participate in the care of your patient.      Sincerely,    Madhav Shaw, DO    "

## 2024-05-30 NOTE — PROGRESS NOTES
Lynsey is a 19 year old who is being evaluated via a billable video visit.      Assessment & Plan     Change in weight  - Primary Care Referral    Cannabis dependence (H)  - Adult Mental Health  Referral; Future    Gastroesophageal reflux disease without esophagitis  - omeprazole (PRILOSEC) 20 MG DR capsule; Take 1 capsule (20 mg) by mouth daily    Unclear cause of patient's fluctuations in appetite and vomiting.  Her appetite can be very dependent upon if she has access to marijuana and she does smoke marijuana every day.  We discussed potential for marijuana to contribute to cyclic vomiting syndrome, and she would be interested in working with a provider to reduce her marijuana usage.  She does express concern about marijuana being the only thing that helps her maintain weight as well.  I think an addiction provider would be helpful for this.  She does have issues with reflux coming back up, as sometimes her symptoms do not include vomiting but more food chunks.  We could have her try omeprazole to see if this is also helpful.    Generalized hyperhidrosis  Patient has had some longstanding issues with hyperhidrosis, shortness of breath.  A virtual visit is rather difficult to perform a comprehensive exam, and I did request that she follow-up with me in person so we can review the symptoms more in depth.  Consider workup for generalized hyperhidrosis if needed.  She had been following with pulmonology related to hemoptysis and had a bronchoscopy  on 5/13.  Chart review does show previous testing for tuberculosis and lung disease, and had a one-time histoplasmosis titer that was positive.            Subjective   Lynsey is a 19 year old, presenting for the following health issues:  No chief complaint on file.  Weight fluctuates up and down- drops drastically a lot. Tried pills/liquid medicine.   Weight fluctuates since 2020. Goes from 100 down to 95 and then 80. Lowest was 84 pounds. Most recent 97.  Sometimes will not eat for a whole day. Started smoking when she was 11 years old. Stopped smoking weed after having son for 1 year.    Smokes marijuana to stimulate appetite- almost everyday. Patient gains a lot more weight. Patient runs out of marijuana on occasion. Sometimes if out of weed for 1 day or longer, her body won't tell her that she is hungry. Non-vomiting pills do help her.    Low appetite.    Has some body image issues-wants to gain weight.    Has severe anxiety/depression- was taking medications before, but had stopped. Had sertraline and quetiapine (for nighttime). Would bring her out of head. Has tried to kill herself 3 times (one time, she used pills).   She gets sweating a lot more than normal. Night sweats, shortness of breath.  She notices sweating a lot that may play a part in this.    HPI         Objective             Physical Exam   GENERAL: alert and no distress  EYES: Eyes grossly normal to inspection.  No discharge or erythema, or obvious scleral/conjunctival abnormalities.  RESP: No audible wheeze, cough, or visible cyanosis.    SKIN: Visible skin clear. No significant rash, abnormal pigmentation or lesions.  NEURO: Cranial nerves grossly intact.  Mentation and speech appropriate for age.  PSYCH: Appropriate affect, tone, and pace of words        Video-Visit Details    Type of service:  Video Visit   Start: 1743, End: 1813  Originating Location (pt. Location): Home    Distant Location (provider location):  On-site  Platform used for Video Visit: [place]  Signed Electronically by: Horacio Balbuena NP

## 2024-05-30 NOTE — NURSING NOTE
Is the patient currently in the state of [place]? YES    Visit mode:VIDEO    If the visit is dropped, the patient can be reconnected by: VIDEO VISIT: Text to cell phone:   Telephone Information:   Mobile [phone]       Will anyone else be joining the visit? NO  (If patient encounters technical issues they should call [phone]    How would you like to obtain your AVS? MyChart    Are changes needed to the allergy or medication list? Pt stated no changes to allergies and Pt stated no med changes    Are refills needed on medications prescribed by this physician? YES  fluticasone (FLOVENT HFA) 110 MCG/ACT inhaler     Reason for visit: ANGELINE Lang LPN

## 2024-06-13 ENCOUNTER — TELEPHONE (OUTPATIENT)
Dept: ADMINISTRATIVE | Facility: OTHER | Age: 19
End: 2024-06-13

## 2024-06-13 NOTE — TELEPHONE ENCOUNTER
Pt calling to report her reflux/vomiting has not improved. Pt also reports she has not picked up or tried omeprazole but will try that and then call back in a few days or week and let us know if it is improved.    Roberto Lundy RN   [place]

## 2024-06-17 ENCOUNTER — MYC MEDICAL ADVICE (OUTPATIENT)
Dept: FAMILY MEDICINE | Facility: CLINIC | Age: 19
End: 2024-06-17
Payer: COMMERCIAL

## 2024-06-17 NOTE — TELEPHONE ENCOUNTER
Called pt and left VM, to have pt call us back and schedule,  Thanks,   Moo Nam RN  [facility]

## 2024-07-18 ENCOUNTER — OFFICE VISIT (OUTPATIENT)
Dept: FAMILY MEDICINE | Facility: CLINIC | Age: 19
End: 2024-07-18
Payer: COMMERCIAL

## 2024-07-18 VITALS
TEMPERATURE: 98.9 F | BODY MASS INDEX: 17.85 KG/M2 | DIASTOLIC BLOOD PRESSURE: 64 MMHG | SYSTOLIC BLOOD PRESSURE: 98 MMHG | HEIGHT: 62 IN | WEIGHT: 97 LBS | RESPIRATION RATE: 16 BRPM | HEART RATE: 79 BPM | OXYGEN SATURATION: 100 %

## 2024-07-18 DIAGNOSIS — R63.0 APPETITE LOSS: Primary | ICD-10-CM

## 2024-07-18 DIAGNOSIS — F12.20 TETRAHYDROCANNABINOL (THC) USE DISORDER, MODERATE, DEPENDENCE (H): ICD-10-CM

## 2024-07-18 DIAGNOSIS — R61 GENERALIZED HYPERHIDROSIS: ICD-10-CM

## 2024-07-18 DIAGNOSIS — M35.00 SJOGREN'S SYNDROME, WITH UNSPECIFIED ORGAN INVOLVEMENT (H): ICD-10-CM

## 2024-07-18 PROCEDURE — 99214 OFFICE O/P EST MOD 30 MIN: CPT

## 2024-07-18 RX ORDER — CYPROHEPTADINE HYDROCHLORIDE 2 MG/5ML
2 SOLUTION ORAL DAILY
Qty: 473 ML | Refills: 1 | Status: SHIPPED | OUTPATIENT
Start: 2024-07-18

## 2024-07-18 ASSESSMENT — PAIN SCALES - GENERAL: PAINLEVEL: NO PAIN (0)

## 2024-07-18 NOTE — PROGRESS NOTES
Assessment & Plan     Appetite loss  - cyproheptadine 2 MG/5ML syrup; Take 5 mLs (2 mg) by mouth daily    Generalized hyperhidrosis  - aluminum chloride (DRYSOL) 20 % external solution; Apply topically at bedtime    Unclear etiology for patient's symptoms that include intermittent weight loss with decreased appetite, generalized hyperhidrosis.  I do wonder if her dysfunction with appetite could be related to her THC use, although she has been working hard to reduce the quantity of use.  She is now down to 5 days/week.  It is unclear to me what the cause of her hyperhidrosis is, as her tuberculosis testing was negative and she has not had any travel there.  She is not currently on any antidepressants that would contribute to hyperhidrosis.  Said negative for HIV and STD testing.  I did not note any lymphadenopathy that could be related to lymphoma.  She has had a negative CBC recently.  We could also consider an echocardiogram that could rule out endocarditis. Granulomatosis is another differential     She continues to be frustrated by the lack of an answer for the cause of her symptoms.  She has had an extensive workup with pulmonology that included a bronchoscopy with negative results, and while she still has some shortness of breath, her hemoptysis has resolved.  Pulmonology note does suggest that patient could see a thoracic surgeon in the future regarding a removal of a calcification or broncholith in her right lower lobe.      She is planning on following up with a rheumatologist regarding her Sjogren's, as it is possible that her Sjogren's may contribute to irregularities in her appetite.  She did not previously tolerate pilocarpine due to hyperhidrosis being a side effect.  She is also planning on following up with the addiction provider for help with THC use and to learn more about THC effects on the gut.    Will try symptomatic treatment for her hyperhidrosis and got that include cyproheptadine and  aluminum chloride, although I cautioned her that we still need to determine the cause of her symptoms.    Sjogren's syndrome, with unspecified organ involvement (H24)  - Adult Rheumatology  Referral; Future    Tetrahydrocannabinol (THC) use disorder, moderate, dependence (H)  - Adult Mental Health  Referral; Future          Subjective   Lynsey is a 19 year old, presenting for the following health issues:  Weight Loss      7/18/2024    10:41 AM   Additional Questions   Roomed by Lorena   Accompanied by Self   Having appetite issues and weight loss started in 2020  Generalized hyperhydrosis is stilli going on; that has been going on since 2022. Gets worse at night, but also sweats at night for no reason.  Needs to change her clothes both at night and during the day. No travel to other countries. Wasn't take any medications when symptoms started.  Had bronchosopy done    Flovent didn't help that much with her breathing      Shortness of breath still occurs: became normal to her. Runs out of breath a lot.    Weight range tends to run from 84 to about 100.  Marijuana: working on cutting down (feeling no type of hungry). 5 times per week of marijuana.    -Helps with irritability.    Infectious disease through [facility]: Sjogren's dx in the past (doesn't take medicine). Calcified lymph nodes noted.    Saw pulmonology for hemoptysis, but this has resolved.     Has constant rhinitis, ear plugged sensation.   Has trouble swallows  Chest pain: but hasn't happened for a long time. Intermittent (has had EKGs)  Sometimes has some vomiting (occasion). Has had runny stools- 2-3 times per day.  Periods: once per month at beginning of month. No vaginal bleeding issues. LMP normal.  Skin rashes periodically that come and go.  Knees having pain: straight up knee brace.  No numbness/tingling. No falls.  Has issues with back pain in the lower back (ever since then).  Depression/anxiety: 6-7/10. Suicidal thoughts:  "prefers not to elaborate (pills/cutting)- April 2019.    -Body dysmorphia- feels upset about how skinny she is.      History of Present Illness       Reason for visit:  Se Rasheed consumes 2 sweetened beverage(s) daily.She exercises with enough effort to increase her heart rate 9 or less minutes per day.  She exercises with enough effort to increase her heart rate 3 or less days per week. She is missing 2 dose(s) of medications per week.  She is not taking prescribed medications regularly due to remembering to take.           Objective    BP 98/64   Pulse 79   Temp 98.9  F (37.2  C) (Temporal)   Resp 16   Ht 1.575 m (5' 2\")   Wt 44 kg (97 lb)   LMP 07/11/2024 (Approximate)   SpO2 100%   BMI 17.74 kg/m    Body mass index is 17.74 kg/m .  Physical Exam   GENERAL: alert and no distress  RESP: lungs clear to auscultation - no rales, rhonchi or wheezes  CV: regular rate and rhythm, normal S1 S2, no S3 or S4, no murmur, click or rub, no peripheral edema   LYMPH: no cervical, supraclavicular, axillary, or inguinal adenopathy          Signed Electronically by: Horacio Balbuena NP    "

## 2024-08-20 ENCOUNTER — E-VISIT (OUTPATIENT)
Dept: FAMILY MEDICINE | Facility: CLINIC | Age: 19
End: 2024-08-20
Payer: COMMERCIAL

## 2024-08-20 ENCOUNTER — NURSE TRIAGE (OUTPATIENT)
Dept: NURSING | Facility: CLINIC | Age: 19
End: 2024-08-20

## 2024-08-20 DIAGNOSIS — N76.0 BACTERIAL VAGINOSIS: Primary | ICD-10-CM

## 2024-08-20 DIAGNOSIS — B96.89 BACTERIAL VAGINOSIS: Primary | ICD-10-CM

## 2024-08-20 PROCEDURE — 99421 OL DIG E/M SVC 5-10 MIN: CPT

## 2024-08-20 RX ORDER — METRONIDAZOLE 500 MG/1
500 TABLET ORAL 2 TIMES DAILY
Qty: 14 TABLET | Refills: 0 | Status: SHIPPED | OUTPATIENT
Start: 2024-08-20 | End: 2024-08-27

## 2024-08-20 NOTE — TELEPHONE ENCOUNTER
S-(situation): vaginal discharge    B-(background):   Started on Sunday        A-(assessment):   Whitish vaginal discharge with foul smell    No fever  No abdominal cramp  No vaginal bleeding  No itching  No pain  No rash or sore in vaginal area      R-(recommendations):   Be seen within 3 days. Pt calling to ask for medication for bacterial vaginosis sent to [facility] pharmacy.    Please advise, otherwise recommend e-visit or VV. Please call [phone]. Requests a call before noon.    Sheridan Oh RN/[place] Nurse Advisor          Reason for Disposition   Bad smelling vaginal discharge    Additional Information   Negative: Followed a genital area injury (e.g., vagina, vulva)   Negative: Symptoms after a sexual assault  (Note: AFTER using this guideline to treat symptoms, go to guideline SEXUAL ASSAULT OR RAPE)   Negative: Pain or burning with passing urine (urination) is main symptom   Negative: Foreign body in vagina (e.g., tampon)   Negative: [1] Pregnant 20 or more weeks  (5 months or more) AND [2] contractions   Negative: Pregnant   Negative: [1] SEVERE abdominal pain (e.g., excruciating) AND [2] present > 1 hour   Negative: Patient sounds very sick or weak to the triager   Negative: [1] Yellow or green vaginal discharge AND [2] fever   Negative: [1] Genital area looks infected (e.g., draining sore, spreading redness) AND [2] fever   Negative: [1] Constant abdominal pain AND [2] present > 2 hours   Negative: [1] Mild lower abdominal pain comes and goes (cramps) AND [2] lasts > 24 hours   Negative: Genital area looks infected (e.g., draining sore, spreading redness)   Negative: [1] Rash is tiny water blisters AND [2] 3 or more   Negative: [1] Rash (e.g., redness, tiny bumps, sore) of genital area AND [2] present > 24 hours    Protocols used: Vaginal [id number]

## 2025-01-13 ENCOUNTER — OFFICE VISIT (OUTPATIENT)
Dept: MIDWIFE SERVICES | Facility: CLINIC | Age: 20
End: 2025-01-13
Payer: COMMERCIAL

## 2025-01-13 VITALS
HEART RATE: 89 BPM | WEIGHT: 104.5 LBS | SYSTOLIC BLOOD PRESSURE: 105 MMHG | DIASTOLIC BLOOD PRESSURE: 70 MMHG | HEIGHT: 62 IN | BODY MASS INDEX: 19.23 KG/M2 | OXYGEN SATURATION: 100 %

## 2025-01-13 DIAGNOSIS — N89.8 VAGINAL DISCHARGE: ICD-10-CM

## 2025-01-13 DIAGNOSIS — N76.0 BV (BACTERIAL VAGINOSIS): Primary | ICD-10-CM

## 2025-01-13 DIAGNOSIS — Z32.00 PREGNANCY EXAMINATION OR TEST, PREGNANCY UNCONFIRMED: ICD-10-CM

## 2025-01-13 DIAGNOSIS — Z11.3 SCREEN FOR STD (SEXUALLY TRANSMITTED DISEASE): Primary | ICD-10-CM

## 2025-01-13 DIAGNOSIS — B96.89 BV (BACTERIAL VAGINOSIS): Primary | ICD-10-CM

## 2025-01-13 LAB
ALBUMIN UR-MCNC: NEGATIVE MG/DL
APPEARANCE UR: CLEAR
BACTERIA #/AREA URNS HPF: ABNORMAL /HPF
BILIRUB UR QL STRIP: NEGATIVE
C TRACH DNA SPEC QL PROBE+SIG AMP: NEGATIVE
CLUE CELLS: PRESENT
COLOR UR AUTO: YELLOW
GLUCOSE UR STRIP-MCNC: NEGATIVE MG/DL
HCG UR QL: NEGATIVE
HGB UR QL STRIP: ABNORMAL
KETONES UR STRIP-MCNC: NEGATIVE MG/DL
LEUKOCYTE ESTERASE UR QL STRIP: NEGATIVE
N GONORRHOEA DNA SPEC QL NAA+PROBE: NEGATIVE
NITRATE UR QL: NEGATIVE
PH UR STRIP: 6.5 [PH] (ref 5–7)
RBC #/AREA URNS AUTO: ABNORMAL /HPF
SP GR UR STRIP: 1.02 (ref 1–1.03)
SPECIMEN TYPE: NORMAL
TRICHOMONAS, WET PREP: ABNORMAL
UROBILINOGEN UR STRIP-ACNC: 1 E.U./DL
WBC #/AREA URNS AUTO: ABNORMAL /HPF
WBC'S/HIGH POWER FIELD, WET PREP: ABNORMAL
YEAST, WET PREP: ABNORMAL

## 2025-01-13 PROCEDURE — 81001 URINALYSIS AUTO W/SCOPE: CPT | Performed by: ADVANCED PRACTICE MIDWIFE

## 2025-01-13 PROCEDURE — 87086 URINE CULTURE/COLONY COUNT: CPT | Performed by: ADVANCED PRACTICE MIDWIFE

## 2025-01-13 PROCEDURE — 87591 N.GONORRHOEAE DNA AMP PROB: CPT | Performed by: ADVANCED PRACTICE MIDWIFE

## 2025-01-13 PROCEDURE — 87491 CHLMYD TRACH DNA AMP PROBE: CPT | Performed by: ADVANCED PRACTICE MIDWIFE

## 2025-01-13 PROCEDURE — 87210 SMEAR WET MOUNT SALINE/INK: CPT | Performed by: ADVANCED PRACTICE MIDWIFE

## 2025-01-13 PROCEDURE — 99459 PELVIC EXAMINATION: CPT | Performed by: ADVANCED PRACTICE MIDWIFE

## 2025-01-13 PROCEDURE — 99385 PREV VISIT NEW AGE 18-39: CPT | Performed by: ADVANCED PRACTICE MIDWIFE

## 2025-01-13 PROCEDURE — 81025 URINE PREGNANCY TEST: CPT | Performed by: ADVANCED PRACTICE MIDWIFE

## 2025-01-13 RX ORDER — METRONIDAZOLE 500 MG/1
500 TABLET ORAL 2 TIMES DAILY
Qty: 14 TABLET | Refills: 0 | Status: SHIPPED | OUTPATIENT
Start: 2025-01-13 | End: 2025-01-20

## 2025-01-13 NOTE — PROGRESS NOTES
"Lynsey is a 19 year old  female who presents for annual exam.     Menses are regular and predictable with patch for birth control lasting 5 days.  Menses flow: normal and medium.  Patient's last menstrual period was 2025 (exact date).. Using Patch for contraception.  She is not currently considering pregnancy.  LMP: 2025  Besides routine health maintenance, .  GYNECOLOGIC HISTORY:  Menarche: 8  Age at first intercourse: 13  Lynsey is sexually active with 1 male partner(s) and is currently in monogamous relationship with boyfriend.    History sexually transmitted infections:Chlamydia and Bacterial vaginosis  STI testing offered?  Accepted  NELY exposure:   History of abnormal Pap smear: No - under age 21, PAP not appropriate for age  Family history of breast CA: Yes (Please explain): maternal aunt and maternal gma  Family history of uterine/ovarian CA: Yes (Please explain): mother    Family history of colon CA: No    HEALTH MAINTENANCE:  Cholesterol: (No results found for: \"CHOL\" History of abnormal lipids: No  Mammo:  . History of abnormal Mammo: .  Regular Self Breast Exams: Yes  Calcium/Vitamin D intake: source:  dietary supplement(s) Adequate? Yes  TSH: (No results found for: \"TSH\" )  Pap; (No results found for: \"PAP\" )    HISTORY:  OB History    Para Term  AB Living   4 1 1 0 0 1   SAB IAB Ectopic Multiple Live Births   0 0 0 0 1      # Outcome Date GA Lbr Myles/2nd Weight Sex Type Anes PTL Lv   4 Term 19 40w0d 03:32 / 01:07 3.085 kg (6 lb 12.8 oz) M Vag-Spont EPI N ALBERTO      Name: SANGEETHA CHAKRABORTY      Apgar1: 8  Apgar5: 9   3             2             1               Past Medical History:   Diagnosis Date    Sjogren's syndrome      Past Surgical History:   Procedure Laterality Date    BRONCHOSCOPY (RIGID OR FLEXIBLE), DIAGNOSTIC N/A 2024    Procedure: BRONCHOSCOPY, bronchoalvoelar lavage.;  Surgeon: Joel Clements MD;  Location: [facility]     No family " history on file.  Social History     Socioeconomic History    Marital status: Single     Spouse name: None    Number of children: None    Years of education: None    Highest education level: None   Tobacco Use    Smoking status: Never     Passive exposure: Past    Smokeless tobacco: Never   Vaping Use    Vaping status: Never Used   Substance and Sexual Activity    Alcohol use: Not Currently    Drug use: Yes     Types: Marijuana    Sexual activity: Not Currently     Social Drivers of Health     Financial Resource Strain: Medium Risk (7/31/2023)    Received from [facility]     Overall Financial Resource Strain (CARDIA)     Difficulty of Paying Living Expenses: Somewhat hard   Food Insecurity: No Food Insecurity (7/31/2023)    Received from [facility]     Hunger Vital Sign     Worried About Running Out of Food in the Last Year: Never true     Ran Out of Food in the Last Year: Never true   Transportation Needs: No Transportation Needs (7/31/2023)    Received from [facility]     PRAPARE - Transportation     Lack of Transportation (Medical): No     Lack of Transportation (Non-Medical): No   Physical Activity: Inactive (7/31/2023)    Received from [facility]     Exercise Vital Sign     Days of Exercise per Week: 0 days     Minutes of Exercise per Session: 0 min   Stress: Stress Concern Present (7/31/2023)    Received from [facility]     [employer] - Occupational Stress Questionnaire     Feeling of Stress : Rather much   Social Connections: Unknown (7/31/2023)    Received from [facility]     Social Connection and Isolation Panel [NHANES]     Frequency of Communication with Friends and Family: Once a week     Attends Scientologist Services: More than 4 times per year     Active Member of Clubs  or Organizations: No     Marital Status: Never    Interpersonal Safety: Low Risk  (7/18/2024)    Interpersonal Safety     Do you feel physically and emotionally safe where you currently live?: Yes     Within the past 12 months, have you been hit, slapped, kicked or otherwise physically hurt by someone?: No     Within the past 12 months, have you been humiliated or emotionally abused in other ways by your partner or ex-partner?: No       Current Outpatient Medications:     aluminum chloride (DRYSOL) 20 % external solution, Apply topically at bedtime, Disp: 60 mL, Rfl: 1    fluticasone (FLOVENT HFA) 110 MCG/ACT inhaler, Inhale 2 puffs into the lungs, Disp: , Rfl:     amitriptyline (ELAVIL) 25 MG tablet, Take 25 mg by mouth (Patient not taking: Reported on 1/13/2025), Disp: , Rfl:     cyproheptadine 2 MG/5ML syrup, Take 5 mLs (2 mg) by mouth daily (Patient not taking: Reported on 1/13/2025), Disp: 473 mL, Rfl: 1    megestrol (MEGACE) 40 MG/ML suspension, Take 400 mg by mouth (Patient not taking: Reported on 1/13/2025), Disp: , Rfl:     omeprazole (PRILOSEC) 20 MG DR capsule, Take 1 capsule (20 mg) by mouth daily (Patient not taking: Reported on 1/13/2025), Disp: 30 capsule, Rfl: 1    pilocarpine (SALAGEN) 5 MG tablet, Take 5 mg by mouth (Patient not taking: Reported on 1/13/2025), Disp: , Rfl:    No Known Allergies    Past medical, surgical, social and family history were reviewed and updated in EPIC.    ROS:   C:     NEGATIVE for fever, chills, change in weight  I:       NEGATIVE for worrisome rashes, moles or lesions  E:     NEGATIVE for vision changes or irritation  E/M: NEGATIVE for ear, mouth and throat problems  R:     NEGATIVE for significant cough or SOB  CV:   NEGATIVE for chest pain, palpitations or peripheral edema  GI:     NEGATIVE for nausea, abdominal pain, heartburn, or change in bowel habits  :   NEGATIVE for frequency, dysuria, hematuria, vaginal discharge, or irregular bleeding  M:      "NEGATIVE for significant arthralgias or myalgia  N:      NEGATIVE for weakness, dizziness or paresthesias  E:      NEGATIVE for temperature intolerance, skin/hair changes  P:      NEGATIVE for changes in mood or affect.    EXAM:  /70 (BP Location: Left arm, Patient Position: Sitting, Cuff Size: Adult Regular)   Pulse 89   Ht 1.575 m (5' 2\")   Wt 47.4 kg (104 lb 8 oz)   LMP 01/12/2025 (Exact Date)   SpO2 100%   BMI 19.11 kg/m     BMI: Body mass index is 19.11 kg/m .  Constitutional: healthy, alert and no distress  Head: Normocephalic. No masses, lesions, tenderness or abnormalities  Neck: Neck supple. Trachea midline. No adenopathy. Thyroid symmetric, normal size.   Cardiovascular: RRR.   Respiratory: Negative.   Breast: Deferred  Gastrointestinal: Abdomen soft, non-tender, non-distended. No masses, organomegaly.  :  Vulva:  No external lesions, normal female hair distribution, no inguinal adenopathy.    Urethra:  Midline, non-tender, well supported, no discharge  Vagina:  Moist, pink, no abnormal discharge, no lesions, menstrual appearing bleeding   Uterus:  Normal size , non-tender, freely mobile  Ovaries:  No masses appreciated, non-tender, mobile  Musculoskeletal: extremities normal  Skin: no suspicious lesions or rashes  Psychiatric: Affect appropriate, cooperative,mentation appears normal.     COUNSELING:   Reviewed preventive health counseling, as reflected in patient instructions   reports that she has never smoked. She has been exposed to tobacco smoke. She has never used smokeless tobacco.      ASSESSMENT:  19 year old female with satisfactory annual exam  1. Screen for STD (sexually transmitted disease) (Primary)  - Chlamydia trachomatis/Neisseria gonorrhoeae by PCR  - UA with Microscopic reflex to Culture - lab collect; Future    2. Vaginal discharge  - Wet prep - Clinic Collect    3. Pregnancy examination or test, pregnancy unconfirmed  - HCG qualitative urine; Future    Nita ANAYA, " RADHA, MPH  1/13/2025

## 2025-01-14 LAB — BACTERIA UR CULT: NORMAL

## 2025-02-13 ENCOUNTER — VIRTUAL VISIT (OUTPATIENT)
Dept: FAMILY MEDICINE | Facility: CLINIC | Age: 20
End: 2025-02-13
Payer: COMMERCIAL

## 2025-02-13 DIAGNOSIS — N89.8 VAGINAL DISCHARGE: Primary | ICD-10-CM

## 2025-02-13 DIAGNOSIS — R68.81 EARLY SATIETY: ICD-10-CM

## 2025-02-13 RX ORDER — MIRTAZAPINE 15 MG/1
15 TABLET, FILM COATED ORAL AT BEDTIME
Qty: 30 TABLET | Refills: 1 | Status: SHIPPED | OUTPATIENT
Start: 2025-02-13

## 2025-02-13 RX ORDER — CLINDAMYCIN HYDROCHLORIDE 300 MG/1
300 CAPSULE ORAL 2 TIMES DAILY
Qty: 14 CAPSULE | Refills: 0 | Status: SHIPPED | OUTPATIENT
Start: 2025-02-13 | End: 2025-02-20

## 2025-02-13 NOTE — PROGRESS NOTES
"Lynsey is a 19 year old who is being evaluated via a billable video visit.    {ROOMING STAFF complete during rooming of virtual visit (Optional):687894}  {If patient encounters technical issues they should call [phone] :061392}    {PROVIDER CHARTING PREFERENCE:804229}    Subjective   Lynsey is a 19 year old, presenting for the following health issues:  No chief complaint on file.      2/13/2025     1:58 PM   Additional Questions   Roomed by Diony     Video Start Time: {video visit start/end time for provider to select:565725}    HPI     {SUPERLIST (Optional):891575}  {additonal problems for provider to add (Optional):783307}    {ROS Picklists (Optional):039955}      Objective           Vitals:  No vitals were obtained today due to virtual visit.    Physical Exam   {video visit exam brief selected:526200}    {Diagnostic Test Results (Optional):623639}      Video-Visit Details    Type of service:  Video Visit   Video End Time:{video visit start/end time for provider to select:143446}  Originating Location (pt. Location): {video visit patient location:940353::\"Home\"}  {PROVIDER LOCATION On-site should be selected for visits conducted from your clinic location or adjoining [facility] hospital, academic office, or other nearby [facility] building. Off-site should be selected for all other provider locations, including home:881819}  Distant Location (provider location):  {virtual location provider:232368}  Platform used for Video Visit: {Virtual Visit Platforms:432249::\"Agitar\"}  Signed Electronically by: Rich Vora MD  {Email feedback regarding this note to [email or web address]   :013746}  "

## 2025-03-16 ENCOUNTER — HEALTH MAINTENANCE LETTER (OUTPATIENT)
Age: 20
End: 2025-03-16

## 2025-05-26 ENCOUNTER — NURSE TRIAGE (OUTPATIENT)
Dept: NURSING | Facility: CLINIC | Age: 20
End: 2025-05-26
Payer: COMMERCIAL

## 2025-05-27 ENCOUNTER — OFFICE VISIT (OUTPATIENT)
Dept: OBGYN | Facility: CLINIC | Age: 20
End: 2025-05-27
Payer: COMMERCIAL

## 2025-05-27 VITALS
HEART RATE: 62 BPM | BODY MASS INDEX: 17.58 KG/M2 | OXYGEN SATURATION: 100 % | DIASTOLIC BLOOD PRESSURE: 68 MMHG | WEIGHT: 96.1 LBS | SYSTOLIC BLOOD PRESSURE: 101 MMHG

## 2025-05-27 DIAGNOSIS — N93.9 ABNORMAL UTERINE BLEEDING (AUB): Primary | ICD-10-CM

## 2025-05-27 DIAGNOSIS — Z12.4 SCREENING FOR CERVICAL CANCER: ICD-10-CM

## 2025-05-27 PROCEDURE — 99459 PELVIC EXAMINATION: CPT

## 2025-05-27 PROCEDURE — 99203 OFFICE O/P NEW LOW 30 MIN: CPT

## 2025-05-27 RX ORDER — NORELGESTROMIN AND ETHINYL ESTRADIOL 35; 150 UG/MG; UG/MG
1 PATCH TRANSDERMAL
COMMUNITY
Start: 2025-04-29

## 2025-05-27 NOTE — TELEPHONE ENCOUNTER
"Nurse Triage SBAR    Is this a 2nd Level Triage? NO    Situation: Vaginal bleeding after period ended    Background:   -Vaginal bleeding    Assessment:   -already had \"regular\"period for 5-7 days, evvery 30 days, stopped for 1 day  -started bleeding again for 4th - 5th day   -Birth control: Patch on 2 years  -Denies abd. Pain, earlier in day some  -Bleeding, very dark, changing pad every 2 hours, not saturated    Protocol Recommended Disposition:   Home Care    Recommendation: Care advice reviewed. Patient verbalized understanding and agreed to follow care advice given. Advised to call back with any new signs or symptoms, Patient agreed  -Pt. Voices concern for vaginal bleeding, more than home care disposition  -Pt. Has seen GYN provider, Nita Dubon in January, advised pt. She could my chart message that provider or schedule a follow up appt. For her concerns         Reason for Disposition   [1] Bleeding or spotting between regular periods AND [2] occurs three cycles (3 months) or less this past year    Additional Information   Negative: Shock suspected (e.g., cold/pale/clammy skin, too weak to stand, low BP, rapid pulse)   Negative: Difficult to awaken or acting confused (e.g., disoriented, slurred speech)   Negative: Passed out (i.e., lost consciousness, collapsed and was not responding)   Negative: Sounds like a life-threatening emergency to the triager   Negative: Followed a genital area injury (e.g., vagina, vulva)   Negative: Pregnant 20 or more weeks   Negative: Pregnant < 20 weeks  (less than 5 months)   Negative: Postpartum (from 0 to 6 weeks after delivery)   Negative: Bleeding occurring > 12 months after menopause   Negative: Bleeding from sexual abuse or rape   Negative: [1] Vaginal discharge is main symptom AND [2] small amount of blood   Negative: SEVERE abdominal pain   Negative: SEVERE dizziness (e.g., unable to stand, requires support to walk, feels like passing out now)   Negative: SEVERE " vaginal bleeding (e.g., soaking 2 pads or tampons per hour and present 2 or more hours; 1 menstrual cup every 2 hours)   Negative: Patient sounds very sick or weak to the triager   Negative: MODERATE vaginal bleeding (e.g., soaking 1 pad or tampon per hour and present > 6 hours; 1 menstrual cup every 6 hours)   Negative: [1] Constant abdominal pain AND [2] present > 2 hours   Negative: Pale skin (pallor) of new-onset or worsening   Negative: Passed tissue (e.g., gray-white)   Negative: Taking Coumadin (warfarin) or other strong blood thinner, or known bleeding disorder (e.g., thrombocytopenia)   Negative: [1] Skin bruises or nosebleed AND [2] not caused by an injury   Negative: [1] Periods with > 6 soaked pads or tampons per day AND [2] last > 7 days   Negative: [1] Bleeding or spotting after procedure (e.g., biopsy) or pelvic examination (e.g., pap smear) AND [2] lasts > 7 days   Negative: Periods with > 6 soaked pads or tampons per day   Negative: Periods last > 7 days   Negative: [1] Uses menstrual cups AND [2] more than 80 ml blood per menstrual period.   Negative: [1] Missed period AND [2] has occurred 2 or more times in the last year   Negative: [1] Menstrual cycle < 21 days OR > 35 days AND [2] occurs more than two cycles (2 months) this past year   Negative: [1] Bleeding or spotting between regular periods AND [2] occurs more than three cycles (3 months) this past year   Negative: [1] Bleeding or spotting between regular periods AND [2] occurs more than three cycles (3 months) AND [3] using birth control medicine (pills, patch, Depo-Provera, Implanon, vaginal ring, Mirena IUD)   Negative: Bleeding or spotting occurs after hysterectomy   Negative: Bleeding or spotting occurs after sex (Exception: First intercourse.)   Negative: Normal menstrual flow   Negative: [1] Menstrual cycle < 21 days OR > 35 days AND [2] has occurred once this past year    Protocols used: Vaginal Bleeding - [id number]  Valerie PEREZ  Chava RN, Triage Nurse Advisor, 5/26/2025 10:09 PM

## 2025-05-27 NOTE — PROGRESS NOTES
"GYN Progress Note     CC: Breakthrough bleeding on the birth control patch    HISTORY OF PRESENT ILLNESS:    Lynsey Chakraborty is a 20 year old  who presents for evaluation of breakthrough bleeding on her birth control patch.  Uses combination patch, wore patch for 3 weeks and then took patch off x7 days and replaced the patch, continued to bleed for a day, so she took the patch off. This has never happened before and Lynsey feels as if something is \"very wrong.\" Has not replaced the patch since removing d/t continuation of bleeding.       OB HISTORY:  OB History    Para Term  AB Living   4 1 1 0 0 1   SAB IAB Ectopic Multiple Live Births   0 0 0 0 1      # Outcome Date GA Lbr Myles/2nd Weight Sex Type Anes PTL Lv   4 Term 19 40w0d 03:32 / 01:07 3.085 kg (6 lb 12.8 oz) M Vag-Spont EPI N ALBERTO      Name: SANGEETHA CHAKRABORTY      Apgar1: 8  Apgar5: 9   3             2             1                      GYN HISTORY:  She has not had a history of abnormal pap smears.  She has had a history of sexually transmitted diseases. Chlamydia, Syphilis  She has had a history of ovarian cysts.  She has not had a history of uterine fibroids.    [unfilled]       PAST MEDICAL HISTORY:  Past Medical History:   Diagnosis Date    Sjogren's syndrome             PAST SURGICAL HISTORY:  Past Surgical History:   Procedure Laterality Date    BRONCHOSCOPY (RIGID OR FLEXIBLE), DIAGNOSTIC N/A 2024    Procedure: BRONCHOSCOPY, bronchoalvoelar lavage.;  Surgeon: Joel Clements MD;  Location: [facility]          CURRENT MEDICATIONS:   Current Outpatient Medications   Medication Sig Dispense Refill    aluminum chloride (DRYSOL) 20 % external solution Apply topically at bedtime 60 mL 1    fluticasone (FLOVENT HFA) 110 MCG/ACT inhaler Inhale 2 puffs into the lungs      mirtazapine (REMERON) 15 MG tablet Take 1 tablet (15 mg) by mouth at bedtime. 30 tablet 1    norelgestromin-ethinyl estradiol (ORTHO EVRA) " 150-35 MCG/24HR patch 1 patch.              ALLERGIES:  Patient has no known allergies.         SOCIAL HISTORY:  Social History     Tobacco Use    Smoking status: Never     Passive exposure: Past    Smokeless tobacco: Never   Vaping Use    Vaping status: Never Used   Substance Use Topics    Alcohol use: Not Currently    Drug use: Yes     Types: Marijuana            FAMILY HISTORY:  No family history on file.       PHYSICAL EXAMINATION:  VS:/68   Pulse 62   Wt 43.6 kg (96 lb 1.6 oz)   LMP 2025 (Approximate)   SpO2 100%   BMI 17.58 kg/m    Body mass index is 17.58 kg/m .       General: looks well, NAD.  Psych: Pleasant and cooperative.  Neuro: alert and oriented x3  Abdomen:  No masses, tenderness, distension.  No scars.  Pelvic:  Normal external female genitalia. Normal well-estrogenized vaginal mucosa with no lesions. No abnormal discharge- active bleeding noted from cervix. Grossly normal cervix.  Papsmear obtained.  Uterus and adnexa nontender to palpation.  Ext: wwp, no edema  SKIN: warm, dry, normal pallor, no lesions        ASSESSMENT:  Lynsey Sharpe is a 20 year old  who presents for evaluation of breakthrough bleeding with birth control patch.      PLAN:  (N93.9) Abnormal uterine bleeding (AUB)  (primary encounter diagnosis)  Comment: prolonged bleeding after menses  Plan: US Pelvic Complete with Transvaginal, TSH with         free T4 reflex, Prolactin, Hemoglobin A1c            (Z12.4) Screening for cervical cancer  Comment: per recommendations  Plan: Pap Screen Only - Recommended Age 21 - 24 Years          HÉCTOR Lanza CNP

## 2025-05-28 ENCOUNTER — LAB (OUTPATIENT)
Dept: LAB | Facility: CLINIC | Age: 20
End: 2025-05-28
Payer: COMMERCIAL

## 2025-05-28 DIAGNOSIS — N93.9 ABNORMAL UTERINE BLEEDING (AUB): ICD-10-CM

## 2025-05-28 LAB
EST. AVERAGE GLUCOSE BLD GHB EST-MCNC: 94 MG/DL
HBA1C MFR BLD: 4.9 % (ref 0–5.6)

## 2025-05-28 PROCEDURE — 83036 HEMOGLOBIN GLYCOSYLATED A1C: CPT

## 2025-05-28 PROCEDURE — 36415 COLL VENOUS BLD VENIPUNCTURE: CPT

## 2025-05-28 PROCEDURE — 84146 ASSAY OF PROLACTIN: CPT

## 2025-05-28 PROCEDURE — 84443 ASSAY THYROID STIM HORMONE: CPT

## 2025-05-29 ENCOUNTER — RESULTS FOLLOW-UP (OUTPATIENT)
Dept: OBGYN | Facility: CLINIC | Age: 20
End: 2025-05-29

## 2025-05-29 LAB
PROLACTIN SERPL 3RD IS-MCNC: 12 NG/ML (ref 5–23)
TSH SERPL DL<=0.005 MIU/L-ACNC: 0.74 UIU/ML (ref 0.3–4.2)

## 2025-06-11 ENCOUNTER — ANCILLARY PROCEDURE (OUTPATIENT)
Dept: ULTRASOUND IMAGING | Facility: CLINIC | Age: 20
End: 2025-06-11
Payer: COMMERCIAL

## 2025-06-11 ENCOUNTER — RESULTS FOLLOW-UP (OUTPATIENT)
Dept: OBGYN | Facility: CLINIC | Age: 20
End: 2025-06-11

## 2025-06-11 ENCOUNTER — OFFICE VISIT (OUTPATIENT)
Dept: OBGYN | Facility: CLINIC | Age: 20
End: 2025-06-11
Payer: COMMERCIAL

## 2025-06-11 VITALS
HEART RATE: 61 BPM | DIASTOLIC BLOOD PRESSURE: 73 MMHG | WEIGHT: 98.8 LBS | BODY MASS INDEX: 18.07 KG/M2 | SYSTOLIC BLOOD PRESSURE: 114 MMHG | OXYGEN SATURATION: 99 %

## 2025-06-11 DIAGNOSIS — N89.8 VAGINAL DISCHARGE: ICD-10-CM

## 2025-06-11 DIAGNOSIS — N93.9 ABNORMAL UTERINE BLEEDING (AUB): Primary | ICD-10-CM

## 2025-06-11 DIAGNOSIS — O23.599 BACTERIAL VAGINOSIS IN PREGNANCY: ICD-10-CM

## 2025-06-11 DIAGNOSIS — N93.9 ABNORMAL UTERINE BLEEDING (AUB): ICD-10-CM

## 2025-06-11 DIAGNOSIS — B96.89 BACTERIAL VAGINOSIS IN PREGNANCY: ICD-10-CM

## 2025-06-11 DIAGNOSIS — R63.4 UNINTENTIONAL WEIGHT LOSS OF MORE THAN 10 POUNDS: ICD-10-CM

## 2025-06-11 DIAGNOSIS — B37.31 YEAST INFECTION OF THE VAGINA: ICD-10-CM

## 2025-06-11 LAB
CLUE CELLS: PRESENT
TRICHOMONAS, WET PREP: ABNORMAL
WBC'S/HIGH POWER FIELD, WET PREP: ABNORMAL
YEAST, WET PREP: PRESENT

## 2025-06-11 PROCEDURE — 87591 N.GONORRHOEAE DNA AMP PROB: CPT

## 2025-06-11 PROCEDURE — 87210 SMEAR WET MOUNT SALINE/INK: CPT

## 2025-06-11 PROCEDURE — 99214 OFFICE O/P EST MOD 30 MIN: CPT

## 2025-06-11 PROCEDURE — 87491 CHLMYD TRACH DNA AMP PROBE: CPT

## 2025-06-11 RX ORDER — METRONIDAZOLE 500 MG/1
500 TABLET ORAL 2 TIMES DAILY
Qty: 14 TABLET | Refills: 0 | Status: SHIPPED | OUTPATIENT
Start: 2025-06-11 | End: 2025-06-18

## 2025-06-11 RX ORDER — FLUCONAZOLE 150 MG/1
150 TABLET ORAL
Qty: 3 TABLET | Refills: 0 | Status: SHIPPED | OUTPATIENT
Start: 2025-06-11 | End: 2025-06-18

## 2025-06-11 NOTE — PROGRESS NOTES
CC: US follow up  S: Lynsey is a 21 yo  here today for US Follow-up for aub  -notes bleeding has stopped after she restarted bcp  -no further bleeding   -no pain  -no GI/ concerns  -other questions include unintentional weight loss of >10lbs    O:/73 (BP Location: Left arm, Patient Position: Sitting, Cuff Size: Adult Small)   Pulse 61   Wt 44.8 kg (98 lb 12.8 oz)   LMP 2025 (Approximate)   SpO2 99%   BMI 18.07 kg/m    Past Medical History:   Diagnosis Date    Sjogren's syndrome      Past Surgical History:   Procedure Laterality Date    BRONCHOSCOPY (RIGID OR FLEXIBLE), DIAGNOSTIC N/A 2024    Procedure: BRONCHOSCOPY, bronchoalvoelar lavage.;  Surgeon: Joel Clements MD;  Location:  GI     Current Outpatient Medications   Medication Sig Dispense Refill    aluminum chloride (DRYSOL) 20 % external solution Apply topically at bedtime 60 mL 1    fluticasone (FLOVENT HFA) 110 MCG/ACT inhaler Inhale 2 puffs into the lungs      mirtazapine (REMERON) 15 MG tablet Take 1 tablet (15 mg) by mouth at bedtime. 30 tablet 1    norelgestromin-ethinyl estradiol (ORTHO EVRA) 150-35 MCG/24HR patch 1 patch.       No current facility-administered medications for this visit.      No Known Allergies  Component      Latest Ref Rng 2025  1:12 PM   Estimated Average Glucose      <117 mg/dL 94    Hemoglobin A1C      0.0 - 5.6 % 4.9    TSH      0.30 - 4.20 uIU/mL 0.74    Prolactin      5 - 23 ng/mL 12        Alert pleasant NAD  RRR  Normal bp and pulse     Impression   Gynecological Ultrasound Report  Pelvic U/S - Transabdominal and Transvaginal   [facility]  Referring Provider: HÉCTOR Lanza CNP  Sonographer:  Rich Bethea RDMS  Indication: Bleeding/Menses- Abnormal uterine bleeding (AUB)  LMP: Patient's last menstrual period was 2025 (approximate). (Menstrual Status: Birth Control)  History: Ovarian cyst     Gynecological Ultrasonography:   Uterus:  anteverted. Contour is smooth/regular.  Size: 8.2 x 4.7 x 4.4 cm  Endometrium: Thickness Total 4.8 mm     Right Ovary: 2.6 x 2.9 x 1.6 cm. Multiple small cysts vs follicles on periphery  Left Ovary: 1.0 x 3.0 x 3.1 cm. Multiple small cysts vs follicles on periphery  Cul de Sac Free Fluid: Trace     Technique: Transvaginal Imaging performed  Transabdominal Imaging performed  3D imaging performed     Impression:      The uterus and ovaries were visualized and no abnormalities were seen.  Both ovaries have mulitple small follicles around the periphery, consistent with the appearance of polycystic ovaries.     Kelly Adorno MD     A/P:  Lynsey is a 19 yo  here today for US Follow-up for aub  1. Abnormal uterine bleeding (AUB) (Primary)  -has resolved with resuming birth control patch  -normal US  -normal labs  -likely due to starting and stopping patch abruptly  -normal pap    2. Vaginal discharge  Will self collect swabs - noting some discharge and discomfort  - Wet prep - Clinic Collect  - Chlamydia trachomatis/Neisseria gonorrhoeae by PCR - Clinic Collect    3. Unintentional weight loss of more than 10 pounds  -notes she was 104>94 in one week, notes this to be a long standing problem  -discussed not due to gyn etiology  -has seen GI/nutrition  -notes she eats well likes protein and healthy fats  -would recc workup with endo  - Adult Endocrinology  Referral; Future    4. Yeast infection of the vagina  - fluconazole (DIFLUCAN) 150 MG tablet; Take 1 tablet (150 mg) by mouth every 3 days for 3 doses.  Dispense: 3 tablet; Refill: 0    5. Bacterial vaginosis in pregnancy  - metroNIDAZOLE (FLAGYL) 500 MG tablet; Take 1 tablet (500 mg) by mouth 2 times daily for 7 days.  Dispense: 14 tablet; Refill: 0      Rtc annually or PRN with concerns  HÉCTOR Gurrola CNP

## 2025-06-12 ENCOUNTER — PATIENT OUTREACH (OUTPATIENT)
Dept: CARE COORDINATION | Facility: CLINIC | Age: 20
End: 2025-06-12
Payer: COMMERCIAL

## 2025-06-12 LAB
C TRACH DNA SPEC QL PROBE+SIG AMP: NEGATIVE
N GONORRHOEA DNA SPEC QL NAA+PROBE: NEGATIVE
SPECIMEN TYPE: NORMAL

## 2025-06-16 ENCOUNTER — PATIENT OUTREACH (OUTPATIENT)
Dept: CARE COORDINATION | Facility: CLINIC | Age: 20
End: 2025-06-16
Payer: COMMERCIAL

## 2025-06-19 ENCOUNTER — TELEPHONE (OUTPATIENT)
Dept: ENDOCRINOLOGY | Facility: CLINIC | Age: 20
End: 2025-06-19
Payer: COMMERCIAL

## 2025-06-19 NOTE — TELEPHONE ENCOUNTER
Left Voicemail (1st Attempt) for the patient to call back and schedule the following:    Appointment type: NEW ENDOCRINE  Provider: Any provider that sees for weight loss  Return date: First available in [facility]  Specialty phone number: [phone]  Additional appointment(s) needed: N/A  Additonal Notes: LVKAVITA, Teresita, attempted to reach the patient to schedule an appointment off a referral per triage message below:    Ita Santana RN  [id number]  Received Endocrine Referral as follows:  R63.4 (ICD-10-CM) - Unintentional weight loss of more than 10 pounds    Per 6/11/25 referring provider progress note:  3. Unintentional weight loss of more than 10 pounds  -notes she was 104>94 in one week, notes this to be a long standing problem  -discussed not due to gyn etiology  -has seen GI/nutrition  -notes she eats well likes protein and healthy fats  -would recc workup with endo  - Adult Endocrinology  Referral; Future    Diagnosis not on Endocrine protocol. As per triage protocol, patient needs to first be scheduled first available and then can be sent to Dr. Foley to triage.    Will send to Clinic Coordinators to arrange office visit first. Please then send back to pool.    Cristel Santana, RN  Endocrine Care Coordinator  [facility] on 6/19/2025 at 1:59 PM

## (undated) RX ORDER — LIDOCAINE HYDROCHLORIDE 10 MG/ML
INJECTION, SOLUTION EPIDURAL; INFILTRATION; INTRACAUDAL; PERINEURAL
Status: DISPENSED
Start: 2024-05-13

## (undated) RX ORDER — LIDOCAINE HYDROCHLORIDE 40 MG/ML
SOLUTION TOPICAL
Status: DISPENSED
Start: 2024-05-13

## (undated) RX ORDER — FENTANYL CITRATE 50 UG/ML
INJECTION, SOLUTION INTRAMUSCULAR; INTRAVENOUS
Status: DISPENSED
Start: 2024-05-13